# Patient Record
Sex: FEMALE | Race: WHITE | NOT HISPANIC OR LATINO | Employment: OTHER | URBAN - METROPOLITAN AREA
[De-identification: names, ages, dates, MRNs, and addresses within clinical notes are randomized per-mention and may not be internally consistent; named-entity substitution may affect disease eponyms.]

---

## 2017-01-19 ENCOUNTER — OFFICE VISIT (OUTPATIENT)
Dept: MEDICAL GROUP | Facility: MEDICAL CENTER | Age: 72
End: 2017-01-19
Payer: MEDICARE

## 2017-01-19 VITALS
WEIGHT: 118 LBS | TEMPERATURE: 98.2 F | BODY MASS INDEX: 23.16 KG/M2 | RESPIRATION RATE: 16 BRPM | SYSTOLIC BLOOD PRESSURE: 132 MMHG | HEART RATE: 64 BPM | DIASTOLIC BLOOD PRESSURE: 84 MMHG | OXYGEN SATURATION: 98 % | HEIGHT: 60 IN

## 2017-01-19 DIAGNOSIS — R42 VERTIGO: ICD-10-CM

## 2017-01-19 DIAGNOSIS — J30.9 ALLERGIC RHINITIS, UNSPECIFIED ALLERGIC RHINITIS TRIGGER, UNSPECIFIED RHINITIS SEASONALITY: ICD-10-CM

## 2017-01-19 DIAGNOSIS — R25.3 EYE MUSCLE TWITCHES: ICD-10-CM

## 2017-01-19 DIAGNOSIS — E89.0 HYPOTHYROIDISM, POSTSURGICAL: ICD-10-CM

## 2017-01-19 DIAGNOSIS — Z12.31 ENCOUNTER FOR SCREENING MAMMOGRAM FOR BREAST CANCER: ICD-10-CM

## 2017-01-19 PROCEDURE — 99214 OFFICE O/P EST MOD 30 MIN: CPT | Performed by: NURSE PRACTITIONER

## 2017-01-19 RX ORDER — FLUTICASONE PROPIONATE 50 MCG
1 SPRAY, SUSPENSION (ML) NASAL DAILY
Qty: 16 G | Refills: 1 | Status: SHIPPED | OUTPATIENT
Start: 2017-01-19 | End: 2017-07-20 | Stop reason: SDUPTHER

## 2017-01-19 NOTE — PROGRESS NOTES
Subjective:     Chief Complaint   Patient presents with   • Dizziness     right eye twitches,      Urszula Isabelle Witt is a 71 y.o. female here today to follow up on:    Allergic rhinitis  Increased symptoms recently, unclear if she's been taking her daily Claritin. She is having brief episodes of vertigo as well, usually triggered by head movement or change of position. Lasting about 30 seconds at a time and then resolving. She's not tried any treatment. No associated nausea, vomiting, headache, facial pain, rapid heartbeat, shortness of breath    Hypothyroidism, postsurgical  Last labs with TSH normal at 0.74, T4 elevated at 1.83   Dose was reduced at that time to 50 µg levothyroxine daily, she's noted more fatigue since dose change. No constipation, hair loss, weight change due to recheck labs    A few days ago her sister noticed twitching of the right eye muscles. This has since resolved    Current medicines (including changes today)  Current Outpatient Prescriptions   Medication Sig Dispense Refill   • fluticasone (FLONASE) 50 MCG/ACT nasal spray Spray 1 Spray in nose every day. 16 g 1   • vitamin D (CHOLECALCIFEROL) 1000 UNIT Tab Take 1 Tab by mouth every day. 30 Tab 11   • levothyroxine (SYNTHROID) 50 MCG Tab Take 1 Tab by mouth Every morning on an empty stomach. 30 Tab 3   • loratadine (CLARITIN) 10 MG Tab Take 1 Tab by mouth every day. 90 Tab 3   • Calcium Carb-Cholecalciferol (CALCIUM 600+D3) 600-200 MG-UNIT Tab 1 tab PO twice daily 60 Tab 6   • Memantine HCl ER (NAMENDA XR) 28 MG CAPSULE SR 24 HR Take 1 Cap by mouth every day. 30 Cap 11   • paroxetine (PAXIL) 20 MG Tab Take 2 Tabs by mouth every day. 60 Tab 6   • RAZADYNE ER 24 MG ER capsule TAKE 1 CAPSULE BY MOUTH EVERY MORNING WITH BREAKFAST. 90 Cap 3   • ibuprofen (MOTRIN) 400 MG Tab Take 1 Tab by mouth every 6 hours as needed. 90 Tab 0   • alendronate (FOSAMAX) 70 MG Tab Take 1 Tab by mouth every 7 days. 12 Tab 1   • BIOTIN MAXIMUM STRENGTH PO Take  10,000 mcg by mouth every day.     • acetaminophen (TYLENOL) 325 MG Tab Take 650 mg by mouth every four hours as needed.     • docusate sodium (COLACE) 100 MG Cap Take 100 mg by mouth 2 times a day.     • Hydrocortisone-Aloe Vera 1 % Cream by Apply externally route.     • cyanocobalamin (VITAMIN B-12) 500 MCG TABS Take 500 mcg by mouth every day.     • Calcium Carbonate-Vit D-Min (CALCIUM 1200 PO) Take 1,200 mg by mouth every day.       No current facility-administered medications for this visit.     She  has a past medical history of Vitamin D deficiency; Varicose veins; Allergic rhinitis; Hemorrhoids; Anxiety; Osteoporosis; Restless legs syndrome (RLS); Arthritis; Thyroid nodule (2012); CATARACT; Depression; S/P subtotal thyroidectomy (2012); Hypothyroidism, postsurgical (2012); and Dementia.    ROS included above     Objective:     Blood pressure 132/84, pulse 64, temperature 36.8 °C (98.2 °F), resp. rate 16, height 1.524 m (5'), weight 53.524 kg (118 lb), SpO2 98 %. Body mass index is 23.05 kg/(m^2).     Physical Exam:  General: Alert, oriented in no acute distress.  Eye contact is good, speech is normal, affect calm  HEENT: Oral mucosa pink moist, no lesions. Nares with erythema, edema, clear mucus. No maxillary or frontal sinus tenderness. TMs gray with good landmarks bilaterally. No cervical or supraclavicular lymphadenopathy.  Lungs: clear to auscultation bilaterally, normal effort, no wheeze/ rhonchi/ rales.  CV: regular rate and rhythm, S1, S2, no murmur  , No CVAT, no hepatosplenomegaly  Ext: no edema, color normal, vascularity normal, temperature normal    Assessment and Plan:   The following treatment plan was discussed   1. Vertigo   brief episodes with change of position or movement of the head possibly related to uncontrolled congestion. Sister will verify with the care facility that she is receiving her Claritin daily. We will also add Flonase, notify me if not improving. May consider referral for  vestibular therapy if needed  CBC WITH DIFFERENTIAL    fluticasone (FLONASE) 50 MCG/ACT nasal spray   2. Hypothyroidism, postsurgical   dose adjustment at the end of November, due to recheck labs  TSH+FREE T4   3. Allergic rhinitis, unspecified allergic rhinitis trigger, unspecified rhinitis seasonality   as discussed above    4. Eye muscle twitches   isolated incident now resolved. Follow-up for persistent problems    5. Encounter for screening mammogram for breast cancer  MA-SCREEN MAMMO W/CAD-BILAT       Followup: Pending labs         Please note that this dictation was created using voice recognition software. I have worked with consultants from the vendor as well as technical experts from [facility] [employer] to optimize the interface. I have made every reasonable attempt to correct obvious errors, but I expect that there are errors of grammar and possibly content that I did not discover before finalizing the note.

## 2017-01-19 NOTE — MR AVS SNAPSHOT
Urszula Witt   2017 10:00 AM   Office Visit   MRN: [id number]    Department:  [facility]   Dept Phone:  [phone]    Description:  Female : 1945   Provider:  BEA Sr           Reason for Visit     Dizziness right eye twitches,       Allergies as of 2017     Allergen Noted Reactions    Nkda [No Known Drug Allergy] 2016         You were diagnosed with     Vertigo   [771458]       Hypothyroidism, postsurgical   [200475]       Allergic rhinitis, unspecified allergic rhinitis trigger, unspecified rhinitis seasonality   [7726695]       Eye muscle twitches   [207631]       Encounter for screening mammogram for breast cancer   [2239905]         Vital Signs     Blood Pressure Pulse Temperature Respirations Height Weight    132/84 mmHg 64 36.8 °C (98.2 °F) 16 1.524 m (5') 53.524 kg (118 lb)    Body Mass Index Oxygen Saturation Smoking Status             23.05 kg/m2 98% Former Smoker         Basic Information     Date Of Birth Sex Race Ethnicity Preferred Language    1945 Female White Non- English      Your appointments     2017 11:30 AM   MA SCRN10 with BYRON ESQUEDA MG 1   [facility] MAMMOGRAPHY ([place])    [address] [facility] [address]  [facility] [address] [phone]           No deodorant, powder, perfume or lotion under the arm or breast area.            2017  1:20 PM   Follow Up Visit with Rory Maldonado M.D.   [facility] Neurology (--)    [address], [address]  [place] [address] [phone]           You will be receiving a confirmation call a few days before your appointment from our automated call confirmation system.            May 01, 2017  1:00 PM   Established Patient with BEA Sr   [facility]    [address]  [place] [address] [phone]           You will be receiving a confirmation call a few  days before your appointment from our automated call confirmation system.              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   8/15/2012 - Present    S/P subtotal thyroidectomy E89.0   Unknown - Present    Hypothyroidism, postsurgical E89.0   Unknown - Present    Alzheimer's disease G30.9   Unknown - Present    DNR (do not resuscitate) Z66   7/21/2016 - Present    Encounter for examination for admission to nursing home Z02.2   7/25/2016 - Present      Health Maintenance        Date Due Completion Dates    COLON CANCER SCREENING ANNUAL FIT 1945 ---    IMM DTaP/Tdap/Td Vaccine (1 - Tdap) 3/10/1962 ---    IMM ZOSTER VACCINE 3/10/2003 ---    IMM PNEUMOCOCCAL 65+ (ADULT) LOW/MEDIUM RISK SERIES (2 of 2 - PPSV23) 6/15/2012 6/15/2011    MAMMOGRAM 12/15/2016 12/15/2015, 2/18/2014, 9/27/2012    BONE DENSITY 4/29/2021 4/29/2016, 9/27/2012            Current Immunizations     13-VALENT PCV PREVNAR 6/15/2011    Influenza TIV (IM) 10/21/2013    Influenza Vaccine Quad Inj (Preserved) 10/24/2016    Tuberculin Skin Test 7/11/2012      Below and/or attached are the medications your provider expects you to take. Review all of your home medications and newly ordered medications with your provider and/or pharmacist. Follow medication instructions as directed by your provider and/or pharmacist. Please keep your medication list with you and share with your provider. Update the information when medications are discontinued, doses are changed, or new  medications (including over-the-counter products) are added; and carry medication information at all times in the event of emergency situations     Allergies:  NKDA - (reactions not documented)               Medications  Valid as of: January 19, 2017 - 11:10 AM    Generic Name Brand Name Tablet Size Instructions for use    Acetaminophen (Tab) TYLENOL 325 MG Take 650 mg by mouth every four hours as needed.        Alendronate Sodium (Tab) FOSAMAX 70 MG Take 1 Tab by mouth every 7 days.        Biotin   Take 10,000 mcg by mouth every day.        Calcium Carb-Cholecalciferol (Tab) Calcium Carb-Cholecalciferol 600-200 MG-UNIT 1 tab PO twice daily        Calcium Carbonate-Vit D-Min   Take 1,200 mg by mouth every day.        Cholecalciferol (Tab) cholecalciferol 1000 UNIT Take 1 Tab by mouth every day.        Cyanocobalamin (Tab) VITAMIN B-12 500 MCG Take 500 mcg by mouth every day.        Docusate Sodium (Cap) COLACE 100 MG Take 100 mg by mouth 2 times a day.        Fluticasone Propionate (Suspension) FLONASE 50 MCG/ACT Spray 1 Spray in nose every day.        Galantamine Hydrobromide (CAPSULE SR 24 HR) RAZADYNE ER 24 MG TAKE 1 CAPSULE BY MOUTH EVERY MORNING WITH BREAKFAST.        Hydrocortisone-Aloe Vera (Cream) Hydrocortisone-Aloe Vera 1 % by Apply externally route.        Ibuprofen (Tab) MOTRIN 400 MG Take 1 Tab by mouth every 6 hours as needed.        Levothyroxine Sodium (Tab) SYNTHROID 50 MCG Take 1 Tab by mouth Every morning on an empty stomach.        Loratadine (Tab) CLARITIN 10 MG Take 1 Tab by mouth every day.        Memantine HCl (CAPSULE SR 24 HR) NAMENDA 28 MG Take 1 Cap by mouth every day.        PARoxetine HCl (Tab) PAXIL 20 MG Take 2 Tabs by mouth every day.        .                 Medicines prescribed today were sent to:     [facility] [place] - [address]    [address] [place] [address]    Phone: [phone] Fax: [phone]    Open 24 Hours?: No    [facility] [place] - [address]    [address] [facility] [place] [address]    Phone: [phone] Fax: [phone]    Open 24 Hours?: No      Medication refill instructions:       If your prescription bottle indicates you have medication refills left, it is not necessary to call your provider’s office. Please contact your pharmacy and they will refill your medication.    If your prescription bottle indicates you do not have any refills left, you may request refills at any time through one of the following ways: The online [employer] system (except Urgent Care), by calling your provider’s office, or by asking your pharmacy to contact your provider’s office with a refill request. Medication refills are processed only during regular business hours and may not be available until the next business day. Your provider may request additional information or to have a follow-up visit with you prior to refilling your medication.   *Please Note: Medication refills are assigned a new Rx number when refilled electronically. Your pharmacy may indicate that no refills were authorized even though a new prescription for the same medication is available at the pharmacy. Please request the medicine by name with the pharmacy before contacting your provider for a refill.        Your To Do List     Future Labs/Procedures Complete By Expires    CBC WITH DIFFERENTIAL  As directed 1/20/2018    MA-SCREEN MAMMO W/CAD-BILAT  As directed 1/19/2018         [employer] Access Code: Activation code not generated  Current [employer] Status: Active

## 2017-01-27 ENCOUNTER — HOSPITAL ENCOUNTER (OUTPATIENT)
Dept: LAB | Facility: MEDICAL CENTER | Age: 72
End: 2017-01-27
Attending: NURSE PRACTITIONER
Payer: MEDICARE

## 2017-01-27 DIAGNOSIS — R42 VERTIGO: ICD-10-CM

## 2017-01-27 LAB
BASOPHILS # BLD AUTO: 0.07 K/UL (ref 0–0.12)
BASOPHILS NFR BLD AUTO: 1.4 % (ref 0–1.8)
EOSINOPHIL # BLD: 0.12 K/UL (ref 0–0.51)
EOSINOPHIL NFR BLD AUTO: 2.4 % (ref 0–6.9)
ERYTHROCYTE [DISTWIDTH] IN BLOOD BY AUTOMATED COUNT: 47.6 FL (ref 35.9–50)
HCT VFR BLD AUTO: 44.7 % (ref 37–47)
HGB BLD-MCNC: 14.5 G/DL (ref 12–16)
IMM GRANULOCYTES # BLD AUTO: 0.01 K/UL (ref 0–0.11)
IMM GRANULOCYTES NFR BLD AUTO: 0.2 % (ref 0–0.9)
LYMPHOCYTES # BLD: 1.05 K/UL (ref 1–4.8)
LYMPHOCYTES NFR BLD AUTO: 20.9 % (ref 22–41)
MCH RBC QN AUTO: 31.8 PG (ref 27–33)
MCHC RBC AUTO-ENTMCNC: 32.4 G/DL (ref 33.6–35)
MCV RBC AUTO: 98 FL (ref 81.4–97.8)
MONOCYTES # BLD: 0.38 K/UL (ref 0–0.85)
MONOCYTES NFR BLD AUTO: 7.6 % (ref 0–13.4)
NEUTROPHILS # BLD: 3.39 K/UL (ref 2–7.15)
NEUTROPHILS NFR BLD AUTO: 67.5 % (ref 44–72)
NRBC # BLD AUTO: 0 K/UL
NRBC BLD-RTO: 0 /100 WBC
PLATELET # BLD AUTO: 305 K/UL (ref 164–446)
PMV BLD AUTO: 9.6 FL (ref 9–12.9)
RBC # BLD AUTO: 4.56 M/UL (ref 4.2–5.4)
T4 FREE SERPL-MCNC: 2.71 NG/DL (ref 0.53–1.43)
TSH SERPL DL<=0.005 MIU/L-ACNC: 5.26 UIU/ML (ref 0.3–3.7)
WBC # BLD AUTO: 5 K/UL (ref 4.8–10.8)

## 2017-01-27 PROCEDURE — 84443 ASSAY THYROID STIM HORMONE: CPT

## 2017-01-27 PROCEDURE — 84439 ASSAY OF FREE THYROXINE: CPT

## 2017-01-27 PROCEDURE — 36415 COLL VENOUS BLD VENIPUNCTURE: CPT

## 2017-01-27 PROCEDURE — 85025 COMPLETE CBC W/AUTO DIFF WBC: CPT

## 2017-01-30 ENCOUNTER — TELEPHONE (OUTPATIENT)
Dept: MEDICAL GROUP | Facility: MEDICAL CENTER | Age: 72
End: 2017-01-30

## 2017-01-30 DIAGNOSIS — E89.0 HYPOTHYROIDISM, POSTSURGICAL: ICD-10-CM

## 2017-01-30 NOTE — TELEPHONE ENCOUNTER
She will just need to recheck labs in about 6 weeks, orders placed. If still abnormal and may need to do an ultrasound of her thyroid or further testing

## 2017-01-30 NOTE — TELEPHONE ENCOUNTER
Spoke with pt regardling labs do you want pt do anything else along with stopping thyroid medication?

## 2017-01-31 NOTE — TELEPHONE ENCOUNTER
Pt's sister called with concerns about discontinuing thyroid medicine. She thought that she had part of her thyroid removed and believes she needs the medication. The pt has been complaining of dizziness. Please advise.     Call back number: [phone]

## 2017-01-31 NOTE — TELEPHONE ENCOUNTER
Pt's daughter left message stating the pt's assisted living needs order to discontinue Levothyroxine.     Please fax to: [phone]

## 2017-02-01 NOTE — TELEPHONE ENCOUNTER
Prescription written.  Spoke with sister, Margarita, regarding recent labs, questions answered. Patient will stop medication, recheck labs in March

## 2017-02-16 ENCOUNTER — HOSPITAL ENCOUNTER (OUTPATIENT)
Dept: RADIOLOGY | Facility: MEDICAL CENTER | Age: 72
End: 2017-02-16
Attending: NURSE PRACTITIONER
Payer: MEDICARE

## 2017-02-16 DIAGNOSIS — Z12.31 ENCOUNTER FOR SCREENING MAMMOGRAM FOR BREAST CANCER: ICD-10-CM

## 2017-02-16 PROCEDURE — 77063 BREAST TOMOSYNTHESIS BI: CPT

## 2017-02-22 DIAGNOSIS — F02.80 LATE ONSET ALZHEIMER'S DISEASE WITHOUT BEHAVIORAL DISTURBANCE (HCC): ICD-10-CM

## 2017-02-22 DIAGNOSIS — G30.1 LATE ONSET ALZHEIMER'S DISEASE WITHOUT BEHAVIORAL DISTURBANCE (HCC): ICD-10-CM

## 2017-02-22 RX ORDER — PAROXETINE HYDROCHLORIDE 40 MG/1
TABLET, FILM COATED ORAL
Qty: 30 TAB | Refills: 6 | Status: SHIPPED | OUTPATIENT
Start: 2017-02-22 | End: 2017-02-24

## 2017-02-23 ENCOUNTER — OFFICE VISIT (OUTPATIENT)
Dept: MEDICAL GROUP | Facility: MEDICAL CENTER | Age: 72
End: 2017-02-23
Payer: MEDICARE

## 2017-02-23 VITALS
OXYGEN SATURATION: 96 % | TEMPERATURE: 98.7 F | BODY MASS INDEX: 24.15 KG/M2 | WEIGHT: 123 LBS | HEIGHT: 60 IN | DIASTOLIC BLOOD PRESSURE: 78 MMHG | HEART RATE: 66 BPM | SYSTOLIC BLOOD PRESSURE: 122 MMHG | RESPIRATION RATE: 16 BRPM

## 2017-02-23 DIAGNOSIS — Z12.11 ENCOUNTER FOR FIT (FECAL IMMUNOCHEMICAL TEST) SCREENING: ICD-10-CM

## 2017-02-23 DIAGNOSIS — E89.0 POSTSURGICAL HYPOTHYROIDISM: ICD-10-CM

## 2017-02-23 DIAGNOSIS — F02.80 LATE ONSET ALZHEIMER'S DISEASE WITHOUT BEHAVIORAL DISTURBANCE (HCC): ICD-10-CM

## 2017-02-23 DIAGNOSIS — Z23 NEED FOR PNEUMOCOCCAL VACCINATION: ICD-10-CM

## 2017-02-23 DIAGNOSIS — F32.A ANXIETY AND DEPRESSION: ICD-10-CM

## 2017-02-23 DIAGNOSIS — G30.1 LATE ONSET ALZHEIMER'S DISEASE WITHOUT BEHAVIORAL DISTURBANCE (HCC): ICD-10-CM

## 2017-02-23 DIAGNOSIS — F41.9 ANXIETY AND DEPRESSION: ICD-10-CM

## 2017-02-23 PROCEDURE — 4040F PNEUMOC VAC/ADMIN/RCVD: CPT | Performed by: NURSE PRACTITIONER

## 2017-02-23 PROCEDURE — 99214 OFFICE O/P EST MOD 30 MIN: CPT | Performed by: NURSE PRACTITIONER

## 2017-02-23 PROCEDURE — 3017F COLORECTAL CA SCREEN DOC REV: CPT | Mod: 1P | Performed by: NURSE PRACTITIONER

## 2017-02-23 PROCEDURE — G8432 DEP SCR NOT DOC, RNG: HCPCS | Performed by: NURSE PRACTITIONER

## 2017-02-23 PROCEDURE — 3288F FALL RISK ASSESSMENT DOCD: CPT | Performed by: NURSE PRACTITIONER

## 2017-02-23 PROCEDURE — G8420 CALC BMI NORM PARAMETERS: HCPCS | Performed by: NURSE PRACTITIONER

## 2017-02-23 PROCEDURE — 0518F FALL PLAN OF CARE DOCD: CPT | Mod: 8P | Performed by: NURSE PRACTITIONER

## 2017-02-23 PROCEDURE — 1100F PTFALLS ASSESS-DOCD GE2>/YR: CPT | Performed by: NURSE PRACTITIONER

## 2017-02-23 PROCEDURE — 3014F SCREEN MAMMO DOC REV: CPT | Performed by: NURSE PRACTITIONER

## 2017-02-23 PROCEDURE — 1036F TOBACCO NON-USER: CPT | Performed by: NURSE PRACTITIONER

## 2017-02-23 PROCEDURE — G8482 FLU IMMUNIZE ORDER/ADMIN: HCPCS | Performed by: NURSE PRACTITIONER

## 2017-02-23 NOTE — ASSESSMENT & PLAN NOTE
Currently managed with Namenda XR 20 mg daily, razadyne ER 24 mg daily. Followed by neurology. Increasing difficulty with mood recently, frustrated much of the time

## 2017-02-23 NOTE — PROGRESS NOTES
Subjective:     Chief Complaint   Patient presents with   • Follow-Up     Urszula Witt is a 71 y.o. female here today to follow up on:    Anxiety and depression  Worsening recently, frustrated with living situation and loss of independence, emotional at times  Continues with paxil 40 mg daily.  Interested in counseling  appt with Dr Maldonado tomorrow    Hypothyroidism, postsurgical  Partial thyroidectomy in 2012  Had been stable on replacement until recent labs which showed elevated T4 at 2.12, TSH also elevated at     Alzheimer's disease  Currently managed with Namenda XR 20 mg daily, razadyne ER 24 mg daily. Followed by neurology. Increasing difficulty with mood recently, frustrated much of the time     Due for pneumovax 23  Current medicines (including changes today)  Current Outpatient Prescriptions   Medication Sig Dispense Refill   • Calcium Carb-Cholecalciferol (CALCIUM + D3) 600-200 MG-UNIT Tab TAKE 1 TABLET BY MOUTH TWICE DAILY. -OSTEOPOROSIS (CYCLE MED) 60 Tab 11   • paroxetine (PAXIL) 40 MG tablet TAKE 1 TABLET BY MOUTH EVERY DAY. (CYCLE MED) 30 Tab 6   • fluticasone (FLONASE) 50 MCG/ACT nasal spray Spray 1 Spray in nose every day. 16 g 1   • vitamin D (CHOLECALCIFEROL) 1000 UNIT Tab Take 1 Tab by mouth every day. 30 Tab 11   • loratadine (CLARITIN) 10 MG Tab Take 1 Tab by mouth every day. 90 Tab 3   • Memantine HCl ER (NAMENDA XR) 28 MG CAPSULE SR 24 HR Take 1 Cap by mouth every day. 30 Cap 11   • paroxetine (PAXIL) 20 MG Tab Take 2 Tabs by mouth every day. 60 Tab 6   • RAZADYNE ER 24 MG ER capsule TAKE 1 CAPSULE BY MOUTH EVERY MORNING WITH BREAKFAST. 90 Cap 3   • ibuprofen (MOTRIN) 400 MG Tab Take 1 Tab by mouth every 6 hours as needed. 90 Tab 0   • alendronate (FOSAMAX) 70 MG Tab Take 1 Tab by mouth every 7 days. 12 Tab 1   • BIOTIN MAXIMUM STRENGTH PO Take 10,000 mcg by mouth every day.     • acetaminophen (TYLENOL) 325 MG Tab Take 650 mg by mouth every four hours as needed.     • docusate sodium  (COLACE) 100 MG Cap Take 100 mg by mouth 2 times a day.     • Hydrocortisone-Aloe Vera 1 % Cream by Apply externally route.     • cyanocobalamin (VITAMIN B-12) 500 MCG TABS Take 500 mcg by mouth every day.     • Calcium Carbonate-Vit D-Min (CALCIUM 1200 PO) Take 1,200 mg by mouth every day.       No current facility-administered medications for this visit.     She  has a past medical history of Vitamin D deficiency; Varicose veins; Allergic rhinitis; Hemorrhoids; Anxiety; Osteoporosis; Restless legs syndrome (RLS); Arthritis; Thyroid nodule (2012); CATARACT; Depression; S/P subtotal thyroidectomy (2012); Hypothyroidism, postsurgical (2012); and Dementia.    ROS included above     Objective:     Blood pressure 122/78, pulse 66, temperature 37.1 °C (98.7 °F), resp. rate 16, height 1.524 m (5'), weight 55.792 kg (123 lb), SpO2 96 %. Body mass index is 24.02 kg/(m^2).     Physical Exam:  General: Alert, oriented in no acute distress.  Eye contact is good, speech is normal, affect calm  HEENT: No lymphadenopathy. No thyromegaly, no obvious mass  Lungs: clear to auscultation bilaterally, normal effort, no wheeze/ rhonchi/ rales.  CV: regular rate and rhythm, S1, S2, no murmur  Abdomen: soft, nontender, no hepatosplenomegaly  Ext: no edema, color normal, vascularity normal, temperature normal    Assessment and Plan:   The following treatment plan was discussed   1. Anxiety and depression   currently managed with Paxil 40 mg daily, increasing depression recently. She is interested in counseling  REFERRAL TO BEHAVIORAL HEALTH   2. Postsurgical hypothyroidism   recent labs with TSH of 5.26, T4 2.12. Thyroid dose discontinued at that time. She complains of poor energy level today despite getting adequate rest. Due to recheck labs in 2 weeks. Ultrasound as listed below, follow-up pending results  US-SOFT TISSUES OF HEAD - NECK   3. Late onset Alzheimer's disease without behavioral disturbance   followed by neurology. Increasing  difficulty with   emotional lability. She has an appointment tomorrow with Dr. Steve    4. Encounter for FIT (fecal immunochemical test) screening  OCCULT BLOOD,FECAL,IMMUNOASSAY   5. Need for pneumococcal vaccination   we discussed administration of Pneumovax 20 today. Unfortunately, the patient left prior to receiving vaccine. Will follow up on this at next visit        Followup: pending labs        Please note that this dictation was created using voice recognition software. I have worked with consultants from the vendor as well as technical experts from [facility] [employer] to optimize the interface. I have made every reasonable attempt to correct obvious errors, but I expect that there are errors of grammar and possibly content that I did not discover before finalizing the note.

## 2017-02-23 NOTE — ASSESSMENT & PLAN NOTE
Partial thyroidectomy in 2012  Had been stable on replacement until recent labs which showed elevated T4 at 2.12, TSH also elevated at

## 2017-02-23 NOTE — MR AVS SNAPSHOT
Urszula Witt   2017 10:00 AM   Office Visit   MRN: [id number]    Department:  [facility]   Dept Phone:  [phone]    Description:  Female : 1945   Provider:  BEA Sr           Reason for Visit     Follow-Up           Allergies as of 2017     Allergen Noted Reactions    Nkda [No Known Drug Allergy] 2016         You were diagnosed with     Anxiety and depression   [240481]       Postsurgical hypothyroidism   [244.0.ICD-9-CM]       Late onset Alzheimer's disease without behavioral disturbance   [2503345]       Encounter for FIT (fecal immunochemical test) screening   [9209133]       Need for pneumococcal vaccination   [770845]       Hypothyroidism, postsurgical   [845518]         Vital Signs     Blood Pressure Pulse Temperature Respirations Height Weight    122/78 mmHg 66 37.1 °C (98.7 °F) 16 1.524 m (5') 55.792 kg (123 lb)    Body Mass Index Oxygen Saturation Smoking Status             24.02 kg/m2 96% Former Smoker         Basic Information     Date Of Birth Sex Race Ethnicity Preferred Language    1945 Female White Non- English      Your appointments     2017  1:20 PM   Follow Up Visit with Rory Maldonado M.D.   [facility] (--)    [address], [address]  [facility] [address] [phone]           You will be receiving a confirmation call a few days before your appointment from our automated call confirmation system.            Mar 07, 2017 10:30 AM   US [id number] with BYRON ESQUEDA US 2   [facility]    [facility]  [address]  [address]  [facility] [id number] [phone]            May 01, 2017  1:00 PM   Established Patient with BEA Sr   [facility] ([facility])    [address]  [facility] [address] [phone]           You will be receiving a confirmation call a few days before your appointment  from our automated call confirmation system.              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   8/15/2012 - Present    S/P subtotal thyroidectomy E89.0   Unknown - Present    Hypothyroidism, postsurgical E89.0   Unknown - Present    Alzheimer's disease G30.9   Unknown - Present    DNR (do not resuscitate) Z66   7/21/2016 - Present    Encounter for examination for admission to nursing home Z02.2   7/25/2016 - Present      Health Maintenance        Date Due Completion Dates    COLON CANCER SCREENING ANNUAL FIT 1945 ---    IMM DTaP/Tdap/Td Vaccine (1 - Tdap) 3/10/1962 ---    IMM ZOSTER VACCINE 3/10/2003 ---    IMM PNEUMOCOCCAL 65+ (ADULT) LOW/MEDIUM RISK SERIES (2 of 2 - PPSV23) 6/15/2012 6/15/2011    MAMMOGRAM 2/16/2018 2/16/2017, 12/15/2015, 2/18/2014, 9/27/2012    BONE DENSITY 4/29/2021 4/29/2016, 9/27/2012            Current Immunizations     13-VALENT PCV PREVNAR 6/15/2011    Influenza TIV (IM) 10/21/2013    Influenza Vaccine Quad Inj (Preserved) 10/24/2016    Pneumococcal polysaccharide vaccine (PPSV-23)  Incomplete    Tuberculin Skin Test 7/11/2012      Below and/or attached are the medications your provider expects you to take. Review all of your home medications and newly ordered medications with your provider and/or pharmacist. Follow medication instructions as directed by your provider and/or pharmacist. Please keep your medication list with you and share with your provider. Update the information when medications are  discontinued, doses are changed, or new medications (including over-the-counter products) are added; and carry medication information at all times in the event of emergency situations     Allergies:  NKDA - (reactions not documented)               Medications  Valid as of: February 23, 2017 - 11:53 AM    Generic Name Brand Name Tablet Size Instructions for use    Acetaminophen (Tab) TYLENOL 325 MG Take 650 mg by mouth every four hours as needed.        Alendronate Sodium (Tab) FOSAMAX 70 MG Take 1 Tab by mouth every 7 days.        Biotin   Take 10,000 mcg by mouth every day.        Calcium Carb-Cholecalciferol (Tab) Calcium + D3 600-200 MG-UNIT TAKE 1 TABLET BY MOUTH TWICE DAILY. -OSTEOPOROSIS (CYCLE MED)        Calcium Carbonate-Vit D-Min   Take 1,200 mg by mouth every day.        Cholecalciferol (Tab) cholecalciferol 1000 UNIT Take 1 Tab by mouth every day.        Cyanocobalamin (Tab) VITAMIN B-12 500 MCG Take 500 mcg by mouth every day.        Docusate Sodium (Cap) COLACE 100 MG Take 100 mg by mouth 2 times a day.        Fluticasone Propionate (Suspension) FLONASE 50 MCG/ACT Spray 1 Spray in nose every day.        Galantamine Hydrobromide (CAPSULE SR 24 HR) RAZADYNE ER 24 MG TAKE 1 CAPSULE BY MOUTH EVERY MORNING WITH BREAKFAST.        Hydrocortisone-Aloe Vera (Cream) Hydrocortisone-Aloe Vera 1 % by Apply externally route.        Ibuprofen (Tab) MOTRIN 400 MG Take 1 Tab by mouth every 6 hours as needed.        Loratadine (Tab) CLARITIN 10 MG Take 1 Tab by mouth every day.        Memantine HCl (CAPSULE SR 24 HR) NAMENDA 28 MG Take 1 Cap by mouth every day.        PARoxetine HCl (Tab) PAXIL 20 MG Take 2 Tabs by mouth every day.        PARoxetine HCl (Tab) PAXIL 40 MG TAKE 1 TABLET BY MOUTH EVERY DAY. (CYCLE MED)        .                 Medicines prescribed today were sent to:     [facility] [place] - [address]    [address] [place] [address]    Phone: [phone] Fax:  [phone]    Open 24 Hours?: No    [facility] [place] - [address]    [address] [place] [address]    Phone: [phone] Fax: [phone]    Open 24 Hours?: No      Medication refill instructions:       If your prescription bottle indicates you have medication refills left, it is not necessary to call your provider’s office. Please contact your pharmacy and they will refill your medication.    If your prescription bottle indicates you do not have any refills left, you may request refills at any time through one of the following ways: The online [employer] system (except Urgent Care), by calling your provider’s office, or by asking your pharmacy to contact your provider’s office with a refill request. Medication refills are processed only during regular business hours and may not be available until the next business day. Your provider may request additional information or to have a follow-up visit with you prior to refilling your medication.   *Please Note: Medication refills are assigned a new Rx number when refilled electronically. Your pharmacy may indicate that no refills were authorized even though a new prescription for the same medication is available at the pharmacy. Please request the medicine by name with the pharmacy before contacting your provider for a refill.        Your To Do List     Future Labs/Procedures Complete By Expires    US-SOFT TISSUES OF HEAD - NECK  As directed 8/26/2017      Referral     A referral request has been sent to our patient care coordination department. Please allow 3-5 business days for us to process this request and contact you either by phone or mail. If you do not hear from us by the 5th business day, please call us at [phone].           [employer] Access Code: Activation code not generated  Current [employer] Status: Active

## 2017-02-23 NOTE — ASSESSMENT & PLAN NOTE
Worsening recently, frustrated with living situation and loss of independence, emotional at times  Continues with paxil 40 mg daily.  Interested in counseling  appt with Dr Maldonado tomorrow

## 2017-02-24 ENCOUNTER — OFFICE VISIT (OUTPATIENT)
Dept: NEUROLOGY | Facility: MEDICAL CENTER | Age: 72
End: 2017-02-24
Payer: MEDICARE

## 2017-02-24 VITALS
OXYGEN SATURATION: 95 % | WEIGHT: 121.8 LBS | HEART RATE: 63 BPM | SYSTOLIC BLOOD PRESSURE: 138 MMHG | DIASTOLIC BLOOD PRESSURE: 74 MMHG | TEMPERATURE: 96.8 F | BODY MASS INDEX: 23.91 KG/M2 | HEIGHT: 60 IN

## 2017-02-24 DIAGNOSIS — G30.1 LATE ONSET ALZHEIMER'S DISEASE WITHOUT BEHAVIORAL DISTURBANCE (HCC): Primary | ICD-10-CM

## 2017-02-24 DIAGNOSIS — F02.80 LATE ONSET ALZHEIMER'S DISEASE WITHOUT BEHAVIORAL DISTURBANCE (HCC): Primary | ICD-10-CM

## 2017-02-24 PROCEDURE — 0518F FALL PLAN OF CARE DOCD: CPT | Mod: 8P | Performed by: PSYCHIATRY & NEUROLOGY

## 2017-02-24 PROCEDURE — G8482 FLU IMMUNIZE ORDER/ADMIN: HCPCS | Performed by: PSYCHIATRY & NEUROLOGY

## 2017-02-24 PROCEDURE — 4040F PNEUMOC VAC/ADMIN/RCVD: CPT | Performed by: PSYCHIATRY & NEUROLOGY

## 2017-02-24 PROCEDURE — G8432 DEP SCR NOT DOC, RNG: HCPCS | Performed by: PSYCHIATRY & NEUROLOGY

## 2017-02-24 PROCEDURE — 3014F SCREEN MAMMO DOC REV: CPT | Performed by: PSYCHIATRY & NEUROLOGY

## 2017-02-24 PROCEDURE — 1100F PTFALLS ASSESS-DOCD GE2>/YR: CPT | Performed by: PSYCHIATRY & NEUROLOGY

## 2017-02-24 PROCEDURE — 3288F FALL RISK ASSESSMENT DOCD: CPT | Performed by: PSYCHIATRY & NEUROLOGY

## 2017-02-24 PROCEDURE — 1036F TOBACCO NON-USER: CPT | Performed by: PSYCHIATRY & NEUROLOGY

## 2017-02-24 PROCEDURE — 99213 OFFICE O/P EST LOW 20 MIN: CPT | Performed by: PSYCHIATRY & NEUROLOGY

## 2017-02-24 PROCEDURE — 3017F COLORECTAL CA SCREEN DOC REV: CPT | Mod: 1P | Performed by: PSYCHIATRY & NEUROLOGY

## 2017-02-24 PROCEDURE — G8420 CALC BMI NORM PARAMETERS: HCPCS | Performed by: PSYCHIATRY & NEUROLOGY

## 2017-02-24 RX ORDER — PAROXETINE HYDROCHLORIDE 20 MG/1
20 TABLET, FILM COATED ORAL DAILY
Qty: 21 TAB | Refills: 0 | Status: SHIPPED | OUTPATIENT
Start: 2017-02-24 | End: 2017-04-13

## 2017-02-25 NOTE — PROGRESS NOTES
Subjective:      Urszula Witt is a 71 y.o. female who presents with her sister Margarita, for follow-up, with a history of Alzheimer's disease.    HPI    Unfortunately, still at the [facility] facility, she remains very unhappy. The people and the organization itself did not seem to engage her much, she still mourns the loss of her ability to drive, independence with living in her own home, etc. She is eating well. We placed her on Paxil 40 mg daily, this has not provided any additional benefit. Other than this, she seems to be doing well. She does take medications when given to her by hospital staff. She does enjoy herself when she is out with family. She is sleeping well, there are no reports by staff of her wandering, or engaging in otherwise dangerous activities. She has not been hallucinating. She remains independent with her ADLs. She has not been falling with regularity, there are no issues with incontinence. Margarita is asking about availability of social avenues and interactions through the Alzheimer's [employer] support groups. Her primary care physician also recommended seeing a therapist. She remains on Namenda XR and Razadyne ER with good tolerability.    Medical, surgical and family history is reviewed in electronic health record, there are no new drug allergies. She is on Namenda XR 28 mg daily, Razadyne ER 24 mg daily, Paxil 40 mg daily, the rest as per the electronic health record.    Review of Systems   All other systems reviewed and are negative.       Objective:     /74 mmHg  Pulse 63  Temp(Src) 36 °C (96.8 °F)  Ht 1.524 m (5')  Wt 55.248 kg (121 lb 12.8 oz)  BMI 23.79 kg/m2  SpO2 95%     Physical Exam    She appears in no acute distress. Clean and appropriately dressed, she is quite cooperative. Her vital signs are stable. There was no malar rash or temporal tenderness. Her neck is supple. She is near fully oriented, there is no aphasia, apraxia, or inattention. There  was some mild reduction in speech fluency, mental processing speed is slowed, she is perseverative at times, she will often repeat topics that had been discussed earlier in the evaluation. Otherwise, cranial nerve, motor, coordination and sensory assessments are for the most part intact.     Assessment/Plan:     1. Late onset Alzheimer's disease without behavioral disturbance  I concur that getting her in with the therapist is a good idea, it certainly offers her an excuse to get out and about. I also agree that finding some support groups through the Alzheimer's disease association would be helpful not only therapeutically but also she might actually engage some new friends. It does not appear that Paxil is working, Zoloft had not prior to this, so I will taper her off, 20 mg daily for 3 weeks and then stopping completely. At that point, Cymbalta or Wellbutrin might be legitimate options. She will continue the Razadyne and Namenda unchanged. I will follow-up with him in 6 months.    - paroxetine (PAXIL) 20 MG Tab; Take 1 Tab by mouth every day.  Dispense: 21 Tab; Refill: 0    Time: Evaluation of 20 minutes for exam, review, discussion, and education  Discussion: As mentioned in the assessment, over 50% of the time spent face-to-face counseling and coordinating care.

## 2017-02-28 ENCOUNTER — TELEPHONE (OUTPATIENT)
Dept: NEUROLOGY | Facility: MEDICAL CENTER | Age: 72
End: 2017-02-28

## 2017-02-28 NOTE — TELEPHONE ENCOUNTER
Pt's sister is calling and stating that her sister's PCP did not get the OV notes from Dr. Maldonado about her reduce in Paxil medication. OV re-faxed to PCP. Pt's notified. KA

## 2017-03-07 ENCOUNTER — HOSPITAL ENCOUNTER (OUTPATIENT)
Dept: RADIOLOGY | Facility: MEDICAL CENTER | Age: 72
End: 2017-03-07
Attending: NURSE PRACTITIONER
Payer: MEDICARE

## 2017-03-07 ENCOUNTER — TELEPHONE (OUTPATIENT)
Dept: NEUROLOGY | Facility: MEDICAL CENTER | Age: 72
End: 2017-03-07

## 2017-03-07 ENCOUNTER — HOSPITAL ENCOUNTER (OUTPATIENT)
Dept: LAB | Facility: MEDICAL CENTER | Age: 72
End: 2017-03-07
Attending: NURSE PRACTITIONER
Payer: MEDICARE

## 2017-03-07 DIAGNOSIS — E89.0 HYPOTHYROIDISM, POSTSURGICAL: ICD-10-CM

## 2017-03-07 DIAGNOSIS — E89.0 POSTSURGICAL HYPOTHYROIDISM: ICD-10-CM

## 2017-03-07 LAB
T3 SERPL-MCNC: 406.2 NG/DL (ref 60–181)
T4 FREE SERPL-MCNC: 1.93 NG/DL (ref 0.53–1.43)
TSH SERPL DL<=0.005 MIU/L-ACNC: 61.15 UIU/ML (ref 0.3–3.7)

## 2017-03-07 PROCEDURE — 76536 US EXAM OF HEAD AND NECK: CPT

## 2017-03-07 NOTE — TELEPHONE ENCOUNTER
Pt's sister is calling and asking if her sister's ID and Insurance card is still here. Called and LM for pt's sister with her  that they are here and PER CHAYO they are in the safe. They will be by the office on 3/8/17 to pick them up. REILLY

## 2017-03-08 ENCOUNTER — TELEPHONE (OUTPATIENT)
Dept: MEDICAL GROUP | Facility: MEDICAL CENTER | Age: 72
End: 2017-03-08

## 2017-03-08 DIAGNOSIS — R79.89 ELEVATED TSH: ICD-10-CM

## 2017-03-08 DIAGNOSIS — E05.90 HYPERTHYROIDISM WITHOUT CRISIS: ICD-10-CM

## 2017-03-21 ENCOUNTER — TELEPHONE (OUTPATIENT)
Dept: MEDICAL GROUP | Facility: MEDICAL CENTER | Age: 72
End: 2017-03-21

## 2017-03-21 DIAGNOSIS — Z01.812 PRE-PROCEDURE LAB EXAM: ICD-10-CM

## 2017-03-21 NOTE — TELEPHONE ENCOUNTER
1. Caller Name: Pt's sister                      Call Back Number: [phone] (M)    2. Message: Pt's sister left message stating they need an order for blood work for MRI. Imaging stated they need a Creatinine ordered.     3. Patient approves office to leave a detailed voicemail/MyChart message: N\A

## 2017-03-23 ENCOUNTER — HOSPITAL ENCOUNTER (OUTPATIENT)
Dept: LAB | Facility: MEDICAL CENTER | Age: 72
End: 2017-03-23
Attending: NURSE PRACTITIONER
Payer: MEDICARE

## 2017-03-23 DIAGNOSIS — Z01.812 PRE-PROCEDURE LAB EXAM: ICD-10-CM

## 2017-03-23 LAB — CREAT SERPL-MCNC: 0.95 MG/DL (ref 0.5–1.4)

## 2017-03-23 PROCEDURE — 36415 COLL VENOUS BLD VENIPUNCTURE: CPT

## 2017-03-23 PROCEDURE — 82565 ASSAY OF CREATININE: CPT

## 2017-03-28 ENCOUNTER — HOSPITAL ENCOUNTER (OUTPATIENT)
Dept: RADIOLOGY | Facility: MEDICAL CENTER | Age: 72
End: 2017-03-28
Attending: NURSE PRACTITIONER
Payer: MEDICARE

## 2017-03-28 DIAGNOSIS — E05.90 HYPERTHYROIDISM WITHOUT CRISIS: ICD-10-CM

## 2017-03-28 DIAGNOSIS — R79.89 ELEVATED TSH: ICD-10-CM

## 2017-03-28 PROCEDURE — 700117 HCHG RX CONTRAST REV CODE 255: Performed by: NURSE PRACTITIONER

## 2017-03-28 PROCEDURE — 70553 MRI BRAIN STEM W/O & W/DYE: CPT

## 2017-03-28 PROCEDURE — A9577 INJ MULTIHANCE: HCPCS | Performed by: NURSE PRACTITIONER

## 2017-03-28 RX ADMIN — GADOBENATE DIMEGLUMINE 10 ML: 529 INJECTION, SOLUTION INTRAVENOUS at 11:23

## 2017-03-30 ENCOUNTER — TELEPHONE (OUTPATIENT)
Dept: MEDICAL GROUP | Facility: MEDICAL CENTER | Age: 72
End: 2017-03-30

## 2017-03-30 DIAGNOSIS — E05.90 HYPERTHYROIDISM: ICD-10-CM

## 2017-03-30 DIAGNOSIS — D35.2 PITUITARY MICROADENOMA (HCC): ICD-10-CM

## 2017-03-30 NOTE — TELEPHONE ENCOUNTER
Spoke with patient's sister to explain MRI result. She will take sister to lab for additional testing. Referral to Dr Skinner placed

## 2017-04-05 ENCOUNTER — HOSPITAL ENCOUNTER (OUTPATIENT)
Dept: LAB | Facility: MEDICAL CENTER | Age: 72
End: 2017-04-05
Attending: NURSE PRACTITIONER
Payer: MEDICARE

## 2017-04-05 LAB
T4 FREE SERPL-MCNC: 0.41 NG/DL (ref 0.53–1.43)
TSH SERPL DL<=0.005 MIU/L-ACNC: 77.2 UIU/ML (ref 0.3–3.7)

## 2017-04-05 PROCEDURE — 83520 IMMUNOASSAY QUANT NOS NONAB: CPT

## 2017-04-05 PROCEDURE — 84443 ASSAY THYROID STIM HORMONE: CPT

## 2017-04-05 PROCEDURE — 36415 COLL VENOUS BLD VENIPUNCTURE: CPT

## 2017-04-05 PROCEDURE — 84439 ASSAY OF FREE THYROXINE: CPT

## 2017-04-06 ENCOUNTER — TELEPHONE (OUTPATIENT)
Dept: MEDICAL GROUP | Facility: MEDICAL CENTER | Age: 72
End: 2017-04-06

## 2017-04-06 NOTE — TELEPHONE ENCOUNTER
----- Message from BEA Sr sent at 4/6/2017  7:40 AM PDT -----  Please check with endo to get patient scheduled- referral was processed 3/30/17

## 2017-04-07 NOTE — TELEPHONE ENCOUNTER
Called ENDO, they state they are trying to get Pt in with Dr. Skinner as he has the soonest available openings starting on the 17th. They are booked solid and this is their only option per Tammy.

## 2017-04-10 ENCOUNTER — OFFICE VISIT (OUTPATIENT)
Dept: MEDICAL GROUP | Facility: CLINIC | Age: 72
End: 2017-04-10
Payer: MEDICARE

## 2017-04-10 VITALS
HEIGHT: 60 IN | RESPIRATION RATE: 16 BRPM | WEIGHT: 118 LBS | OXYGEN SATURATION: 97 % | HEART RATE: 70 BPM | DIASTOLIC BLOOD PRESSURE: 90 MMHG | BODY MASS INDEX: 23.16 KG/M2 | TEMPERATURE: 97 F | SYSTOLIC BLOOD PRESSURE: 130 MMHG

## 2017-04-10 DIAGNOSIS — E89.0 HYPOTHYROIDISM, POSTSURGICAL: ICD-10-CM

## 2017-04-10 DIAGNOSIS — E89.0 S/P SUBTOTAL THYROIDECTOMY: ICD-10-CM

## 2017-04-10 PROCEDURE — 1100F PTFALLS ASSESS-DOCD GE2>/YR: CPT | Performed by: NURSE PRACTITIONER

## 2017-04-10 PROCEDURE — 1036F TOBACCO NON-USER: CPT | Performed by: NURSE PRACTITIONER

## 2017-04-10 PROCEDURE — G8420 CALC BMI NORM PARAMETERS: HCPCS | Performed by: NURSE PRACTITIONER

## 2017-04-10 PROCEDURE — 3014F SCREEN MAMMO DOC REV: CPT | Performed by: NURSE PRACTITIONER

## 2017-04-10 PROCEDURE — 4040F PNEUMOC VAC/ADMIN/RCVD: CPT | Performed by: NURSE PRACTITIONER

## 2017-04-10 PROCEDURE — 0518F FALL PLAN OF CARE DOCD: CPT | Mod: 8P | Performed by: NURSE PRACTITIONER

## 2017-04-10 PROCEDURE — 99214 OFFICE O/P EST MOD 30 MIN: CPT | Performed by: NURSE PRACTITIONER

## 2017-04-10 PROCEDURE — 3288F FALL RISK ASSESSMENT DOCD: CPT | Performed by: NURSE PRACTITIONER

## 2017-04-10 PROCEDURE — G8432 DEP SCR NOT DOC, RNG: HCPCS | Performed by: NURSE PRACTITIONER

## 2017-04-10 RX ORDER — LEVOTHYROXINE SODIUM 0.03 MG/1
25 TABLET ORAL
Qty: 30 TAB | Refills: 1 | Status: SHIPPED | OUTPATIENT
Start: 2017-04-10 | End: 2017-04-10 | Stop reason: SDUPTHER

## 2017-04-10 RX ORDER — LEVOTHYROXINE SODIUM 0.05 MG/1
50 TABLET ORAL
Qty: 30 TAB | Refills: 2 | Status: SHIPPED | OUTPATIENT
Start: 2017-04-10 | End: 2017-05-05

## 2017-04-10 RX ORDER — LEVOTHYROXINE SODIUM 0.05 MG/1
50 TABLET ORAL
Qty: 30 TAB | Refills: 2 | Status: SHIPPED | OUTPATIENT
Start: 2017-04-10 | End: 2017-04-10 | Stop reason: SDUPTHER

## 2017-04-10 ASSESSMENT — ENCOUNTER SYMPTOMS
CHILLS: 0
ABDOMINAL PAIN: 0
SHORTNESS OF BREATH: 0
WEAKNESS: 1
WHEEZING: 0
FEVER: 0
SPUTUM PRODUCTION: 0
DIARRHEA: 0
COUGH: 0
FALLS: 0
DEPRESSION: 1
DIZZINESS: 1
VOMITING: 0
NAUSEA: 1

## 2017-04-10 NOTE — MR AVS SNAPSHOT
Urszula Walton Eduar   4/10/2017 3:40 PM   Office Visit   MRN: [id number]    Department:  [facility]   Dept Phone:  [phone]    Description:  Female : 1945   Provider:  BEA Lugo           Reason for Visit     Dizziness nausea/ dizzy/ sweating/       Allergies as of 4/10/2017     Allergen Noted Reactions    Nkda [No Known Drug Allergy] 2016         You were diagnosed with     S/P subtotal thyroidectomy   [665111]       Hypothyroidism, postsurgical   [616297]         Vital Signs     Blood Pressure Pulse Temperature Respirations Height Weight    130/90 mmHg 70 36.1 °C (97 °F) 16 1.524 m (5') 53.524 kg (118 lb)    Body Mass Index Oxygen Saturation Smoking Status             23.05 kg/m2 97% Former Smoker         Basic Information     Date Of Birth Sex Race Ethnicity Preferred Language    1945 Female White Non- English      Your appointments     May 01, 2017  1:00 PM   Established Patient with BEA Sr   [facility])    [address]  [place] [address] [phone]           You will be receiving a confirmation call a few days before your appointment from our automated call confirmation system.            Aug 25, 2017  1:20 PM   Follow Up Visit with Rory Maldonado M.D.   [facility] (--)    [address], [address]  [place] [address] [phone]           You will be receiving a confirmation call a few days before your appointment from our automated call confirmation system.              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   8/15/2012 - Present    S/P subtotal thyroidectomy E89.0   Unknown - Present    Hypothyroidism, postsurgical E89.0   Unknown - Present    DNR (do not resuscitate) Z66   7/21/2016 - Present    Encounter for examination for admission to nursing home Z02.2   7/25/2016 - Present    Late onset Alzheimer's disease without behavioral disturbance G30.1, F02.80   2/24/2017 - Present      Health Maintenance        Date Due Completion Dates    COLON CANCER SCREENING ANNUAL FIT 1945 ---    IMM DTaP/Tdap/Td Vaccine (1 - Tdap) 3/10/1962 ---    IMM ZOSTER VACCINE 3/10/2003 ---    IMM PNEUMOCOCCAL 65+ (ADULT) LOW/MEDIUM RISK SERIES (2 of 2 - PPSV23) 6/15/2012 6/15/2011    MAMMOGRAM 2/16/2018 2/16/2017, 12/15/2015, 2/18/2014, 9/27/2012    BONE DENSITY 4/29/2021 4/29/2016, 9/27/2012            Current Immunizations     13-VALENT PCV PREVNAR 6/15/2011    Influenza TIV (IM) 10/21/2013    Influenza Vaccine Quad Inj (Preserved) 10/24/2016    Tuberculin Skin Test 7/11/2012      Below and/or attached are the medications your provider expects you to take. Review all of your home medications and newly ordered medications with your provider and/or pharmacist. Follow medication instructions as directed by your provider and/or pharmacist. Please keep your medication list with you and share with your provider. Update the information when medications are discontinued, doses are changed, or new medications (including over-the-counter products) are added; and carry medication information at all times in the event of emergency situations     Allergies:  NKDA - (reactions not documented)               Medications  Valid as of: April 10, 2017 -  5:09 PM    Generic Name Brand Name Tablet Size Instructions for use    Acetaminophen (Tab) TYLENOL 325 MG Take 650 mg by mouth every four hours as needed.         Alendronate Sodium (Tab) FOSAMAX 70 MG Take 1 Tab by mouth every 7 days.        Biotin   Take 10,000 mcg by mouth every day.        Calcium Carb-Cholecalciferol (Tab) Calcium + D3 600-200 MG-UNIT TAKE 1 TABLET BY MOUTH TWICE DAILY. -OSTEOPOROSIS (CYCLE MED)        Calcium Carbonate-Vit D-Min   Take 1,200 mg by mouth every day.        Cholecalciferol (Tab) cholecalciferol 1000 UNIT Take 1 Tab by mouth every day.        Cyanocobalamin (Tab) VITAMIN B-12 500 MCG Take 500 mcg by mouth every day.        Docusate Sodium (Cap) COLACE 100 MG Take 100 mg by mouth 2 times a day.        Fluticasone Propionate (Suspension) FLONASE 50 MCG/ACT Spray 1 Spray in nose every day.        Galantamine Hydrobromide (CAPSULE SR 24 HR) RAZADYNE ER 24 MG TAKE 1 CAPSULE BY MOUTH EVERY MORNING WITH BREAKFAST.        Hydrocortisone-Aloe Vera (Cream) Hydrocortisone-Aloe Vera 1 % by Apply externally route.        Ibuprofen (Tab) MOTRIN 400 MG Take 1 Tab by mouth every 6 hours as needed.        Levothyroxine Sodium (Tab) SYNTHROID 25 MCG Take 1 Tab by mouth Every morning on an empty stomach.        Loratadine (Tab) CLARITIN 10 MG Take 1 Tab by mouth every day.        Memantine HCl (CAPSULE SR 24 HR) NAMENDA 28 MG Take 1 Cap by mouth every day.        PARoxetine HCl (Tab) PAXIL 20 MG Take 1 Tab by mouth every day.        .                 Medicines prescribed today were sent to:     [facility] [place] [address] [place] [address] [address]    Phone: [phone] Fax: [phone]    Open 24 Hours?: No      Medication refill instructions:       If your prescription bottle indicates you have medication refills left, it is not necessary to call your provider’s office. Please contact your pharmacy and they will refill your medication.    If your prescription bottle indicates you do not have any refills left, you may request refills at any time through one of the following ways: The online [employer] system  (except Urgent Care), by calling your provider’s office, or by asking your pharmacy to contact your provider’s office with a refill request. Medication refills are processed only during regular business hours and may not be available until the next business day. Your provider may request additional information or to have a follow-up visit with you prior to refilling your medication.   *Please Note: Medication refills are assigned a new Rx number when refilled electronically. Your pharmacy may indicate that no refills were authorized even though a new prescription for the same medication is available at the pharmacy. Please request the medicine by name with the pharmacy before contacting your provider for a refill.           [employer] Access Code: Activation code not generated  Current [employer] Status: Active

## 2017-04-10 NOTE — PROGRESS NOTES
Chief Complaint   Patient presents with   • Dizziness     nausea/ dizzy/ sweating/        HISTORY OF PRESENT ILLNESS: Patient is a 72 y.o. female established patient who presents today due to months hx of dizziness, weakness, low energy level. Pt's sister reports that pt has thyroid problem and her number was noted to be high so her thyroid medication was discontinued and then she also underwent brain MRI and she was then referred to endocrinologist.   Chart reviewed noticed that pt has underlying disease of postsurgical hypothyroidism (subtotal thyroidism in 2012). She was on levothyroxine 50-75 mcg. She is noted to have TSH/FT4 5.26/2.73 in Feb 2017 and levothyroxine was discontinued. Pt is noted to have elevate TSH to 61 in March and 77 in April. No levothyroxine at this time. 3/28 Brain MRI showing 4.8 mm hypoenhancing focus in the pituitary at the midline suggesting a pituitary microadenoma. Mild to moderate cerebral atrophy. According to correspondence, pt is going to see endocrinologist, possible 4/17?, appointment is not scheduled.       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 21 Tab 0   • Calcium Carb-Cholecalciferol (CALCIUM + D3) 600-200 MG-UNIT Tab TAKE 1 TABLET BY MOUTH TWICE DAILY.  -OSTEOPOROSIS (CYCLE MED) 60 Tab 11   • fluticasone (FLONASE) 50 MCG/ACT nasal spray Spray 1 Spray in nose every day. 16 g 1   • vitamin D (CHOLECALCIFEROL) 1000 UNIT Tab Take 1 Tab by mouth every day. 30 Tab 11   • loratadine (CLARITIN) 10 MG Tab Take 1 Tab by mouth every day. 90 Tab 3   • Memantine HCl ER (NAMENDA XR) 28 MG CAPSULE SR 24 HR Take 1 Cap by mouth every day. 30 Cap 11   • RAZADYNE ER 24 MG ER capsule TAKE 1 CAPSULE BY MOUTH EVERY MORNING WITH BREAKFAST. 90 Cap 3   • ibuprofen (MOTRIN) 400 MG Tab Take 1 Tab by mouth every 6 hours as needed. 90 Tab 0   • alendronate (FOSAMAX) 70 MG Tab Take 1 Tab by mouth every 7 days. 12 Tab 1   • BIOTIN MAXIMUM STRENGTH PO Take 10,000 mcg by mouth every day.     • acetaminophen (TYLENOL) 325 MG Tab Take 650 mg by mouth every four hours as needed.     • docusate sodium (COLACE) 100 MG Cap Take 100 mg by mouth 2 times a day.     • Hydrocortisone-Aloe Vera 1 % Cream by Apply externally route.     • cyanocobalamin (VITAMIN B-12) 500 MCG TABS Take 500 mcg by mouth every day.     • Calcium Carbonate-Vit D-Min (CALCIUM 1200 PO) Take 1,200 mg by mouth every day.       No current facility-administered medications for this visit.       Social History   Substance Use Topics   • Smoking status: Former Smoker -- 2.00 packs/day for 25 years     Types: Cigarettes     Quit date: 1987   • Smokeless tobacco: Never Used   • Alcohol Use: 1.5 oz/week     3 Glasses of wine per week      Comment: 1 glass of wine a week       Family Status   Relation Status Death Age   • Mother     • Father     • Sister Alive    • Brother Alive    • Maternal Grandmother     • Maternal Grandfather     • Paternal Grandmother     • Paternal Grandfather       Family History   Problem Relation Age of Onset   • Cancer Mother    • Cancer Father    • Cancer Maternal Grandmother    • Heart Disease Maternal Grandfather    • Heart Disease Paternal Grandmother    •  Lung Disease Paternal Grandfather          ROS:  Review of Systems   Constitutional: Positive for malaise/fatigue. Negative for fever and chills.   Respiratory: Negative for cough, sputum production, shortness of breath and wheezing.    Gastrointestinal: Positive for nausea. Negative for vomiting, abdominal pain and diarrhea.   Musculoskeletal: Negative for falls.   Neurological: Positive for dizziness and weakness.   Psychiatric/Behavioral: Positive for depression.        Objective:     Blood pressure 130/90, pulse 70, temperature 36.1 °C (97 °F), resp. rate 16, height 1.524 m (5'), weight 53.524 kg (118 lb), SpO2 97 %.     Physical Exam:  Physical Exam   Constitutional: She is oriented to person, place, and time and well-developed, well-nourished, and in no distress.   HENT:   Head: Normocephalic and atraumatic.   Eyes: Conjunctivae are normal.   Neck: Neck supple. No JVD present.   Cardiovascular: Normal rate.    Pulmonary/Chest: Effort normal. No respiratory distress. She has no wheezes. She has no rales.   Neurological: She is alert and oriented to person, place, and time.   Gait slow but steady   Skin: Skin is dry. No erythema.   Vitals reviewed.        Assessment/Plan:  1. S/P subtotal thyroidectomy  - levothyroxine (SYNTHROID) 50 MCG Tab; Take 1 Tab by mouth Every morning on an empty stomach.  Dispense: 30 Tab; Refill: 2    2. Hypothyroidism, postsurgical  - 4/5/2017 TSH 77  - levothyroxine (SYNTHROID) 50 MCG Tab; Take 1 Tab by mouth Every morning on an empty stomach.  Dispense: 30 Tab; Refill: 2 --> Pt was taking 50-75 mcg last year, 11/2016 on 50 mcg which is discontinued on 2/1/2017. 1/27/2017 TSH 5.26. Levothyroxine Might need to increase to 75 mcg. Instruct pt to follow up TSH in 6-8 weeks with PCP or endocrinologist to adjust. Pt's sister verbalized understanding.   - Endocrinologist referral made by PCP already for pituitary microadenoma, only 4.8 mm. Was thinking order prolactin but since pt is going  to see endocrinologist so that will have endocrinologist to decide lab order. Possible 4/17? No appointment scheduled yet. Instructed pt to call PCP to follow up if not getting call in next 2-3 days. Pt and sister verbalized understanding.     Total time spent was 40 minutes with >50% of time spent on counseling and coordination of care.

## 2017-04-12 ENCOUNTER — TELEPHONE (OUTPATIENT)
Dept: NEUROLOGY | Facility: MEDICAL CENTER | Age: 72
End: 2017-04-12

## 2017-04-12 DIAGNOSIS — F02.80 LATE ONSET ALZHEIMER'S DISEASE WITHOUT BEHAVIORAL DISTURBANCE (HCC): ICD-10-CM

## 2017-04-12 DIAGNOSIS — G30.1 LATE ONSET ALZHEIMER'S DISEASE WITHOUT BEHAVIORAL DISTURBANCE (HCC): ICD-10-CM

## 2017-04-12 LAB — MISCELLANEOUS LAB RESULT MISCLAB: NORMAL

## 2017-04-12 NOTE — TELEPHONE ENCOUNTER
Pt's sister is calling and stating that her sister has been on the lower dose of the Paxil. She has been taking the 1/2 tablet for three weeks now and she is ready to get off this medication and start a new Anti-depressant medication. Per your note you were going to try either Cymbalta or Wellbutrin. She needs a new Rx sent to her pharmacy. Please advise. Thank you. REILLY

## 2017-04-13 RX ORDER — DULOXETIN HYDROCHLORIDE 30 MG/1
30 CAPSULE, DELAYED RELEASE ORAL DAILY
Qty: 30 CAP | Refills: 2 | Status: SHIPPED | OUTPATIENT
Start: 2017-04-13 | End: 2017-06-23 | Stop reason: SDUPTHER

## 2017-04-28 ENCOUNTER — PATIENT OUTREACH (OUTPATIENT)
Dept: HEALTH INFORMATION MANAGEMENT | Facility: OTHER | Age: 72
End: 2017-04-28

## 2017-04-28 NOTE — PROGRESS NOTES
Outbound call to Urszula for medication review. Left a voice message requesting patient to call back.     Michelle Zazueta, MIGUEL ÁNGELD

## 2017-05-02 ENCOUNTER — PATIENT OUTREACH (OUTPATIENT)
Dept: HEALTH INFORMATION MANAGEMENT | Facility: OTHER | Age: 72
End: 2017-05-02

## 2017-05-05 ENCOUNTER — OFFICE VISIT (OUTPATIENT)
Dept: ENDOCRINOLOGY | Facility: MEDICAL CENTER | Age: 72
End: 2017-05-05
Payer: MEDICARE

## 2017-05-05 VITALS
SYSTOLIC BLOOD PRESSURE: 132 MMHG | DIASTOLIC BLOOD PRESSURE: 86 MMHG | BODY MASS INDEX: 23.56 KG/M2 | HEIGHT: 60 IN | WEIGHT: 120 LBS | OXYGEN SATURATION: 92 % | HEART RATE: 74 BPM

## 2017-05-05 DIAGNOSIS — E55.9 VITAMIN D DEFICIENCY: ICD-10-CM

## 2017-05-05 DIAGNOSIS — E53.8 VITAMIN B12 DEFICIENCY: ICD-10-CM

## 2017-05-05 DIAGNOSIS — D35.2 PITUITARY ADENOMA (HCC): ICD-10-CM

## 2017-05-05 DIAGNOSIS — E03.9 HYPOTHYROIDISM (ACQUIRED): ICD-10-CM

## 2017-05-05 PROCEDURE — G8420 CALC BMI NORM PARAMETERS: HCPCS | Performed by: INTERNAL MEDICINE

## 2017-05-05 PROCEDURE — 4040F PNEUMOC VAC/ADMIN/RCVD: CPT | Performed by: INTERNAL MEDICINE

## 2017-05-05 PROCEDURE — 1036F TOBACCO NON-USER: CPT | Performed by: INTERNAL MEDICINE

## 2017-05-05 PROCEDURE — G8432 DEP SCR NOT DOC, RNG: HCPCS | Performed by: INTERNAL MEDICINE

## 2017-05-05 PROCEDURE — 99204 OFFICE O/P NEW MOD 45 MIN: CPT | Performed by: INTERNAL MEDICINE

## 2017-05-05 PROCEDURE — 3014F SCREEN MAMMO DOC REV: CPT | Performed by: INTERNAL MEDICINE

## 2017-05-05 PROCEDURE — 0518F FALL PLAN OF CARE DOCD: CPT | Mod: 8P | Performed by: INTERNAL MEDICINE

## 2017-05-05 PROCEDURE — 1100F PTFALLS ASSESS-DOCD GE2>/YR: CPT | Performed by: INTERNAL MEDICINE

## 2017-05-05 PROCEDURE — 3288F FALL RISK ASSESSMENT DOCD: CPT | Performed by: INTERNAL MEDICINE

## 2017-05-05 RX ORDER — LEVOTHYROXINE SODIUM 0.1 MG/1
100 TABLET ORAL
Qty: 30 TAB | Refills: 3 | Status: SHIPPED | OUTPATIENT
Start: 2017-05-05 | End: 2017-07-24

## 2017-05-05 NOTE — PROGRESS NOTES
New Patient Consult Note  Primary care physician: BEA Sr    Reason for consult:  Hypothyroidism and fatigue    HPI:  Urszula Witt is a 72 y.o. old patient who comes in today for evaluation of hypothyroidism (status post surgical removal of the right thyroid lobe due to nodule and the pathology was benign). She is currently on 50 µg of levothyroxine. She feels extremely tired and fatigued throughout the day. She is also feeling a lot depressed. Looking at her trend of her thyroid labs it seems that there may have been some confusion regarding its interpretation. On one instance the TSH was very high and at the same time the free T4 was also in the high normal range. It appears that the free T4 measurement here was most likely faulty or that the labs were drawn within 2-4 hours of her taking the thyroid medication.  Also the high doses of Biotene can interfere with the measurement of the thyroid function.   In any case looking at the trend of her thyroid labs it is consistent with hypothyroidism.     ROS:  Constitutional: Fatigue, No weight loss  Cardiac: No palpitations or racing heart  Resp: No shortness of breath  Neuro: No numbness or tinging in feet  Endo: No heat or cold intolerance, no polyuria or polydipsia  Psych : Depression  All other systems were reviewed and were negative.    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   • Anxiety and depression    • Vitamin d deficiency 03/29/2012   • Allergic rhinitis 03/29/2012   • HEMORRHOIDS 03/29/2012   • Varicose veins 03/29/2012   • Preventative health care 03/29/2012   • Osteoporosis    • Restless  legs syndrome (RLS)        Past Surgical History:  Past Surgical History   Procedure Laterality Date   • Tonsillectomy and adenoidectomy     • Bunionectomy       sam twice   • Appendectomy     • Thyroid lobectomy  8/15/2012     Performed by VIDA AMARAL at SURGERY SAME DAY [facility] ORS       Allergies:  Nkda    Social History:  Social History     Social History   • Marital Status:      Spouse Name: N/A   • Number of Children: N/A   • Years of Education: N/A     Occupational History   • Not on file.     Social History Main Topics   • Smoking status: Former Smoker -- 2.00 packs/day for 25 years     Types: Cigarettes     Quit date: 08/09/1987   • Smokeless tobacco: Never Used   • Alcohol Use: 1.5 oz/week     3 Glasses of wine per week      Comment: 1 glass of wine a week   • Drug Use: No   • Sexual Activity: Not on file     Other Topics Concern   • Not on file     Social History Narrative       Family History:  Family History   Problem Relation Age of Onset   • Cancer Mother    • Cancer Father    • Cancer Maternal Grandmother    • Heart Disease Maternal Grandfather    • Heart Disease Paternal Grandmother    • Lung Disease Paternal Grandfather        Medications:    Current outpatient prescriptions:   •  levothyroxine (SYNTHROID) 100 MCG Tab, Take 1 Tab by mouth Every morning on an empty stomach., Disp: 30 Tab, Rfl: 3  •  duloxetine (CYMBALTA) 30 MG Cap DR Particles, Take 1 Cap by mouth every day., Disp: 30 Cap, Rfl: 2  •  Calcium Carb-Cholecalciferol (CALCIUM + D3) 600-200 MG-UNIT Tab, TAKE 1 TABLET BY MOUTH TWICE DAILY. -OSTEOPOROSIS (CYCLE MED), Disp: 60 Tab, Rfl: 11  •  fluticasone (FLONASE) 50 MCG/ACT nasal spray, Spray 1 Spray in nose every day., Disp: 16 g, Rfl: 1  •  vitamin D (CHOLECALCIFEROL) 1000 UNIT Tab, Take 1 Tab by mouth every day., Disp: 30 Tab, Rfl: 11  •  Memantine HCl ER (NAMENDA XR) 28 MG CAPSULE SR 24 HR, Take 1 Cap by mouth every day., Disp: 30 Cap, Rfl: 11  •  RAZADYNE ER 24 MG ER  capsule, TAKE 1 CAPSULE BY MOUTH EVERY MORNING WITH BREAKFAST., Disp: 90 Cap, Rfl: 3  •  ibuprofen (MOTRIN) 400 MG Tab, Take 1 Tab by mouth every 6 hours as needed., Disp: 90 Tab, Rfl: 0  •  alendronate (FOSAMAX) 70 MG Tab, Take 1 Tab by mouth every 7 days., Disp: 12 Tab, Rfl: 1  •  BIOTIN MAXIMUM STRENGTH PO, Take 10,000 mcg by mouth every day., Disp: , Rfl:   •  acetaminophen (TYLENOL) 325 MG Tab, Take 650 mg by mouth every four hours as needed., Disp: , Rfl:   •  cyanocobalamin (VITAMIN B-12) 500 MCG TABS, Take 500 mcg by mouth every day., Disp: , Rfl:   •  Calcium Carbonate-Vit D-Min (CALCIUM 1200 PO), Take 1,200 mg by mouth every day., Disp: , Rfl:   •  loratadine (CLARITIN) 10 MG Tab, Take 1 Tab by mouth every day., Disp: 90 Tab, Rfl: 3  •  docusate sodium (COLACE) 100 MG Cap, Take 100 mg by mouth 2 times a day., Disp: , Rfl:   •  Hydrocortisone-Aloe Vera 1 % Cream, by Apply externally route., Disp: , Rfl:     Labs:    Physical Examination:  Vital signs: /86 mmHg  Pulse 74  Ht 1.524 m (5')  Wt 54.432 kg (120 lb)  BMI 23.44 kg/m2  SpO2 92%  General: No apparent distress, cooperative  Eyes: No scleral icterus or discharge  ENMT: Normal on external inspection of nose, lips  Neck: No abnormal masses on inspection  Resp: Normal effort, clear to auscultation bilaterally   CVS: Regular rate and rhythm, S1 S2 normal, no murmur   Extremities: No edema  Neuro: Alert and oriented, deep tendon reflexes are delayed in both upper and lower extremities.  Skin: No rash  Psych: appears depressed however has no suicidal ideation, still in overall good spirits    Assessment and Plan:    1. Hypothyroidism (acquired)  Chemical and clinically hypothyroid. We will repeat the labs again after 6 weeks. If labs still show confusing results will consider stopping her high doses of Biotene for a few weeks and then repeat the thyroid function test again to be able to make an appropriate interpretation. (High doses of Biotene  can interfere with the testing of TSH and T4)    - levothyroxine (SYNTHROID) 100 MCG Tab; Take 1 Tab by mouth Every morning on an empty stomach.  Dispense: 30 Tab; Refill: 3  - ESTRADIOL; Future  - TSH; Future  - FREE THYROXINE; Future  - TRIIDOTHYRONINE; Future    2. Pituitary adenoma (CMS-HCC)  The pituitary adenoma is only about 4 mm. No further intervention is required at this point. Will order labs to assess pituitary function.  - FSH/LH; Future  - ESTRADIOL; Future  - ACTH; Future  - CORTISOL; Future    3. Vitamin D deficiency  Low vitamin D can also contribute to fatigue and therefore with obtained labs for further clarification.  - VITAMIN D,25 HYDROXY; Future    4. Vitamin B12 deficiency  We will follow up on the vitamin B12 levels as that can also contribute to tiredness.  - VITAMIN B12; Future      Return in about 1 month (around 6/5/2017).    Thank you for allowing me to participate in the care of this patient.    Sulaiman Woo M.D.  05/05/2017    CC:   XAVIER Sr.P.R.N.    This note was created using voice recognition software (Dragon). The accuracy of the dictation is limited by the abilities of the software. I have reviewed the note prior to signing, however some errors in grammar and context are still possible. If you have any questions related to this note please do not hesitate to contact our office.

## 2017-05-05 NOTE — MR AVS SNAPSHOT
Usrzula Witt   2017 1:00 PM   Office Visit   MRN: [id number]    Department:  Endocrinology Med [facility]   Dept Phone:  [phone]    Description:  Female : 1945   Provider:  Sulaiman Woo M.D.           Allergies as of 2017     Allergen Noted Reactions    Nkda [No Known Drug Allergy] 2016         You were diagnosed with     Hypothyroidism (acquired)   [658538]       Pituitary adenoma (CMS-HCC)   [951550]       Vitamin D deficiency   [2767545]       Vitamin B12 deficiency   [134330]         Vital Signs     Blood Pressure Pulse Height Weight Body Mass Index Oxygen Saturation    132/86 mmHg 74 1.524 m (5') 54.432 kg (120 lb) 23.44 kg/m2 92%    Smoking Status                   Former Smoker           Basic Information     Date Of Birth Sex Race Ethnicity Preferred Language    1945 Female White Non- English      Your appointments     2017  1:10 PM   Established Patient with Sulaiman Woo M.D.   [facility] End[facility]in[facility]    [address], [address]  [place] [address] [phone]           You will be receiving a confirmation call a few days before your appointment from our automated call confirmation system.            Aug 25, 2017  1:20 PM   Follow Up Visit with Rory Maldonado M.D.   [facility] Neurology (--)    [address], [address]  [place] [address] [phone]           You will be receiving a confirmation call a few days before your appointment from our automated call confirmation system.              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 D44.9   8/15/2012 - Present    S/P subtotal thyroidectomy E89.0   Unknown - Present    Hypothyroidism, postsurgical E89.0   Unknown - Present    DNR (do not resuscitate) Z66   7/21/2016 - Present    Encounter for examination for admission to nursing home Z02.2   7/25/2016 - Present    Late onset Alzheimer's disease without behavioral disturbance G30.1, F02.80   2/24/2017 - Present      Health Maintenance        Date Due Completion Dates    COLON CANCER SCREENING ANNUAL FIT 1945 ---    IMM DTaP/Tdap/Td Vaccine (1 - Tdap) 3/10/1962 ---    IMM ZOSTER VACCINE 3/10/2003 ---    IMM PNEUMOCOCCAL 65+ (ADULT) LOW/MEDIUM RISK SERIES (2 of 2 - PPSV23) 6/15/2012 6/15/2011    MAMMOGRAM 2/16/2018 2/16/2017, 12/15/2015, 2/18/2014, 9/27/2012    BONE DENSITY 4/29/2021 4/29/2016, 9/27/2012            Current Immunizations     13-VALENT PCV PREVNAR 6/15/2011    Influenza TIV (IM) 10/21/2013    Influenza Vaccine Quad Inj (Preserved) 10/24/2016    Tuberculin Skin Test 7/11/2012      Below and/or attached are the medications your provider expects you to take. Review all of your home medications and newly ordered medications with your provider and/or pharmacist. Follow medication instructions as directed by your provider and/or pharmacist. Please keep your medication list with you and share with your provider. Update the information when medications are discontinued, doses are changed, or new medications (including over-the-counter products) are added; and carry medication information at all times in the event of emergency situations     Allergies:  NKDA - (reactions not documented)               Medications  Valid as of: May 05, 2017 -  2:08 PM    Generic Name Brand Name Tablet Size Instructions for use    Acetaminophen (Tab) TYLENOL 325 MG Take 650 mg by mouth every four hours as needed.        Alendronate Sodium  (Tab) FOSAMAX 70 MG Take 1 Tab by mouth every 7 days.        Biotin   Take 10,000 mcg by mouth every day.        Calcium Carb-Cholecalciferol (Tab) Calcium + D3 600-200 MG-UNIT TAKE 1 TABLET BY MOUTH TWICE DAILY. -OSTEOPOROSIS (CYCLE MED)        Calcium Carbonate-Vit D-Min   Take 1,200 mg by mouth every day.        Cholecalciferol (Tab) cholecalciferol 1000 UNIT Take 1 Tab by mouth every day.        Cyanocobalamin (Tab) VITAMIN B-12 500 MCG Take 500 mcg by mouth every day.        Docusate Sodium (Cap) COLACE 100 MG Take 100 mg by mouth 2 times a day.        DULoxetine HCl (Cap DR Particles) CYMBALTA 30 MG Take 1 Cap by mouth every day.        Fluticasone Propionate (Suspension) FLONASE 50 MCG/ACT Spray 1 Spray in nose every day.        Galantamine Hydrobromide (CAPSULE SR 24 HR) RAZADYNE ER 24 MG TAKE 1 CAPSULE BY MOUTH EVERY MORNING WITH BREAKFAST.        Hydrocortisone-Aloe Vera (Cream) Hydrocortisone-Aloe Vera 1 % by Apply externally route.        Ibuprofen (Tab) MOTRIN 400 MG Take 1 Tab by mouth every 6 hours as needed.        Levothyroxine Sodium (Tab) SYNTHROID 100 MCG Take 1 Tab by mouth Every morning on an empty stomach.        Loratadine (Tab) CLARITIN 10 MG Take 1 Tab by mouth every day.        Memantine HCl (CAPSULE SR 24 HR) NAMENDA 28 MG Take 1 Cap by mouth every day.        .                 Medicines prescribed today were sent to:     [facility] [place] [address] [place] [address] [facility] [address]    Phone: [phone] Fax: [phone]    Open 24 Hours?: No      Medication refill instructions:       If your prescription bottle indicates you have medication refills left, it is not necessary to call your provider’s office. Please contact your pharmacy and they will refill your medication.    If your prescription bottle indicates you do not have any refills left, you may request refills at any time through one of the following ways: The online [employer] system  (except Urgent Care), by calling your provider’s office, or by asking your pharmacy to contact your provider’s office with a refill request. Medication refills are processed only during regular business hours and may not be available until the next business day. Your provider may request additional information or to have a follow-up visit with you prior to refilling your medication.   *Please Note: Medication refills are assigned a new Rx number when refilled electronically. Your pharmacy may indicate that no refills were authorized even though a new prescription for the same medication is available at the pharmacy. Please request the medicine by name with the pharmacy before contacting your provider for a refill.        Your To Do List     Future Labs/Procedures Complete By Expires    ACTH  As directed 5/5/2018    CORTISOL  As directed 5/5/2018    Comments:    Between 7.0 am and 9.0 am. Fasting not required.    ESTRADIOL  As directed 5/5/2018    FREE THYROXINE  As directed 5/5/2018    FSH/LH  As directed 5/5/2018    TRIIDOTHYRONINE  As directed 5/5/2018    Comments:    Total T3    TSH  As directed 5/5/2018    VITAMIN B12  As directed 5/5/2018    VITAMIN D,25 HYDROXY  As directed 5/5/2018         [employer] Access Code: Activation code not generated  Current [employer] Status: Active

## 2017-05-10 ENCOUNTER — TELEPHONE (OUTPATIENT)
Dept: MEDICAL GROUP | Facility: MEDICAL CENTER | Age: 72
End: 2017-05-10

## 2017-05-10 DIAGNOSIS — F02.80 ALZHEIMER'S DEMENTIA WITHOUT BEHAVIORAL DISTURBANCE, UNSPECIFIED TIMING OF DEMENTIA ONSET: ICD-10-CM

## 2017-05-10 DIAGNOSIS — G30.9 ALZHEIMER'S DEMENTIA WITHOUT BEHAVIORAL DISTURBANCE, UNSPECIFIED TIMING OF DEMENTIA ONSET: ICD-10-CM

## 2017-05-10 NOTE — TELEPHONE ENCOUNTER
1. Caller Name: Margarita (Pt's Sister)                                         Call Back Number: [phone]      Patient approves a detailed voicemail message: yes    2. SPECIFIC Action To Be Taken: Calling to request a referral to Dr. Merrill in Neurolgy for 2nd Opinion.     3. Diagnosis/Clinical Reason for Request: Alzheimer's    4. Specialty & Provider Name/Lab/Imaging Location: Neurology, Dr. Merrill     5. Is appointment scheduled for requested order/referral: no    Pt would like a second opinion, is not happy with Dr. Sarabia.

## 2017-05-15 ENCOUNTER — PATIENT OUTREACH (OUTPATIENT)
Dept: HEALTH INFORMATION MANAGEMENT | Facility: OTHER | Age: 72
End: 2017-05-15

## 2017-05-15 NOTE — PROGRESS NOTES
3rd attempt to reach Urszula for medication review. Left a voice message requesting patient to call back.     Plan: Mailed a letter to patient describing our services. Will follow-up when patient makes contact.     Michelle Zazueta, MIGUEL ÁNGELD

## 2017-06-02 ENCOUNTER — TELEPHONE (OUTPATIENT)
Dept: ENDOCRINOLOGY | Facility: MEDICAL CENTER | Age: 72
End: 2017-06-02

## 2017-06-02 NOTE — TELEPHONE ENCOUNTER
Spoke to sister(Margarita) and she mentioned that she will bring her sister on Monday in [facility].    Did informed Pt sister to call our office on Monday as soon as they had the blood work done.    Thank you  Marisela

## 2017-06-05 ENCOUNTER — HOSPITAL ENCOUNTER (OUTPATIENT)
Dept: LAB | Facility: MEDICAL CENTER | Age: 72
End: 2017-06-05
Attending: INTERNAL MEDICINE
Payer: MEDICARE

## 2017-06-05 DIAGNOSIS — E55.9 VITAMIN D DEFICIENCY: ICD-10-CM

## 2017-06-05 DIAGNOSIS — E53.8 VITAMIN B12 DEFICIENCY: ICD-10-CM

## 2017-06-05 DIAGNOSIS — E03.9 HYPOTHYROIDISM (ACQUIRED): ICD-10-CM

## 2017-06-05 DIAGNOSIS — D35.2 PITUITARY ADENOMA (HCC): ICD-10-CM

## 2017-06-05 LAB
25(OH)D3 SERPL-MCNC: 30 NG/ML (ref 30–100)
ESTRADIOL SERPL-MCNC: <20 PG/ML
FSH SERPL-ACNC: 98.7 MIU/ML
LH SERPL-ACNC: 33 IU/L
T3 SERPL-MCNC: 244.7 NG/DL (ref 60–181)
T4 FREE SERPL-MCNC: 2.83 NG/DL (ref 0.53–1.43)
TSH SERPL DL<=0.005 MIU/L-ACNC: 0.64 UIU/ML (ref 0.3–3.7)
VIT B12 SERPL-MCNC: >1500 PG/ML (ref 211–911)

## 2017-06-05 PROCEDURE — 82306 VITAMIN D 25 HYDROXY: CPT

## 2017-06-05 PROCEDURE — 82024 ASSAY OF ACTH: CPT

## 2017-06-05 PROCEDURE — 84443 ASSAY THYROID STIM HORMONE: CPT

## 2017-06-05 PROCEDURE — 82607 VITAMIN B-12: CPT

## 2017-06-05 PROCEDURE — 84439 ASSAY OF FREE THYROXINE: CPT

## 2017-06-05 PROCEDURE — 36415 COLL VENOUS BLD VENIPUNCTURE: CPT

## 2017-06-05 PROCEDURE — 83001 ASSAY OF GONADOTROPIN (FSH): CPT

## 2017-06-05 PROCEDURE — 82670 ASSAY OF TOTAL ESTRADIOL: CPT

## 2017-06-05 PROCEDURE — 84480 ASSAY TRIIODOTHYRONINE (T3): CPT

## 2017-06-05 PROCEDURE — 83002 ASSAY OF GONADOTROPIN (LH): CPT

## 2017-06-06 ENCOUNTER — TELEPHONE (OUTPATIENT)
Dept: NEUROLOGY | Facility: MEDICAL CENTER | Age: 72
End: 2017-06-06

## 2017-06-06 NOTE — TELEPHONE ENCOUNTER
Pharmacy faxed asking for a prescription of Ondansetron 4mg ODT. I find no RX in our system of this medication. Please advise. Thanks!  - CR

## 2017-06-07 ENCOUNTER — TELEPHONE (OUTPATIENT)
Dept: ENDOCRINOLOGY | Facility: MEDICAL CENTER | Age: 72
End: 2017-06-07

## 2017-06-07 LAB — ACTH PLAS-MCNC: 18 PG/ML (ref 6–58)

## 2017-06-09 ENCOUNTER — HOSPITAL ENCOUNTER (OUTPATIENT)
Dept: LAB | Facility: MEDICAL CENTER | Age: 72
End: 2017-06-09
Attending: INTERNAL MEDICINE
Payer: MEDICARE

## 2017-06-09 DIAGNOSIS — D35.2 PITUITARY ADENOMA (HCC): ICD-10-CM

## 2017-06-09 LAB — CORTIS SERPL-MCNC: 9.7 UG/DL (ref 0–23)

## 2017-06-09 PROCEDURE — 36415 COLL VENOUS BLD VENIPUNCTURE: CPT

## 2017-06-09 PROCEDURE — 82533 TOTAL CORTISOL: CPT

## 2017-06-12 ENCOUNTER — OFFICE VISIT (OUTPATIENT)
Dept: ENDOCRINOLOGY | Facility: MEDICAL CENTER | Age: 72
End: 2017-06-12
Payer: MEDICARE

## 2017-06-12 VITALS
OXYGEN SATURATION: 88 % | WEIGHT: 120 LBS | BODY MASS INDEX: 21.26 KG/M2 | SYSTOLIC BLOOD PRESSURE: 126 MMHG | HEIGHT: 63 IN | DIASTOLIC BLOOD PRESSURE: 82 MMHG | HEART RATE: 80 BPM

## 2017-06-12 DIAGNOSIS — E03.9 ACQUIRED HYPOTHYROIDISM: ICD-10-CM

## 2017-06-12 DIAGNOSIS — R53.83 FATIGUE, UNSPECIFIED TYPE: ICD-10-CM

## 2017-06-12 DIAGNOSIS — E55.9 VITAMIN D DEFICIENCY: ICD-10-CM

## 2017-06-12 PROCEDURE — 99214 OFFICE O/P EST MOD 30 MIN: CPT | Performed by: INTERNAL MEDICINE

## 2017-06-12 RX ORDER — ERGOCALCIFEROL 1.25 MG/1
50000 CAPSULE ORAL
Qty: 14 CAP | Refills: 0 | Status: SHIPPED | OUTPATIENT
Start: 2017-06-12 | End: 2017-07-24

## 2017-06-12 NOTE — PROGRESS NOTES
Endocrinology Clinic Progress Note  PCP: BEA Sr    CC: Follow-up for hypothyroidism    HPI:  Urszula Witt is a 72 y.o. old patient who comes in today for routine follow up. For her hypothyroidism she is on 100 µg of levothyroxine daily (in the previous visit I had increased her levothyroxine from 50 to 100 µg daily). Both the patient and her sister states that she feels much better compared to her previous visit. He is not complaining of dizziness anymore. Sister also states that her energy levels have improved and overall she just looks in better health compared to the previous visit.  Patient agrees to all of this and states that she clearly feels much better than before however there is still this element of tiredness which is persistent.    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   • Anxiety and depression    • Vitamin d deficiency 03/29/2012   • Allergic rhinitis 03/29/2012   • HEMORRHOIDS 03/29/2012   • Varicose veins 03/29/2012   • Preventative health care 03/29/2012   • Osteoporosis    • Restless legs syndrome (RLS)        Medications:    Current outpatient prescriptions:   •  vitamin D, Ergocalciferol, (DRISDOL) 15409 UNITS Cap capsule, Take 1 Cap by mouth every 3 days for 42 days., Disp: 14 Cap, Rfl: 0  •  levothyroxine (SYNTHROID) 100 MCG Tab, Take 1 Tab by mouth Every morning on an empty stomach., Disp: 30 Tab, Rfl: 3  •  duloxetine (CYMBALTA) 30 MG Cap DR Particles, Take 1 Cap by mouth every day., Disp: 30 Cap, Rfl:  "2  •  Calcium Carb-Cholecalciferol (CALCIUM + D3) 600-200 MG-UNIT Tab, TAKE 1 TABLET BY MOUTH TWICE DAILY. -OSTEOPOROSIS (CYCLE MED), Disp: 60 Tab, Rfl: 11  •  fluticasone (FLONASE) 50 MCG/ACT nasal spray, Spray 1 Spray in nose every day., Disp: 16 g, Rfl: 1  •  vitamin D (CHOLECALCIFEROL) 1000 UNIT Tab, Take 1 Tab by mouth every day., Disp: 30 Tab, Rfl: 11  •  loratadine (CLARITIN) 10 MG Tab, Take 1 Tab by mouth every day., Disp: 90 Tab, Rfl: 3  •  Memantine HCl ER (NAMENDA XR) 28 MG CAPSULE SR 24 HR, Take 1 Cap by mouth every day., Disp: 30 Cap, Rfl: 11  •  RAZADYNE ER 24 MG ER capsule, TAKE 1 CAPSULE BY MOUTH EVERY MORNING WITH BREAKFAST., Disp: 90 Cap, Rfl: 3  •  ibuprofen (MOTRIN) 400 MG Tab, Take 1 Tab by mouth every 6 hours as needed., Disp: 90 Tab, Rfl: 0  •  alendronate (FOSAMAX) 70 MG Tab, Take 1 Tab by mouth every 7 days., Disp: 12 Tab, Rfl: 1  •  BIOTIN MAXIMUM STRENGTH PO, Take 10,000 mcg by mouth every day., Disp: , Rfl:   •  acetaminophen (TYLENOL) 325 MG Tab, Take 650 mg by mouth every four hours as needed., Disp: , Rfl:   •  docusate sodium (COLACE) 100 MG Cap, Take 100 mg by mouth 2 times a day., Disp: , Rfl:   •  Hydrocortisone-Aloe Vera 1 % Cream, by Apply externally route., Disp: , Rfl:   •  cyanocobalamin (VITAMIN B-12) 500 MCG TABS, Take 500 mcg by mouth every day., Disp: , Rfl:   •  Calcium Carbonate-Vit D-Min (CALCIUM 1200 PO), Take 1,200 mg by mouth every day., Disp: , Rfl:     Labs:     Physical Examination:  Vital signs: /82 mmHg  Pulse 80  Ht 1.6 m (5' 3\")  Wt 54.432 kg (120 lb)  BMI 21.26 kg/m2  SpO2 88%  General: No apparent distress, cooperative  Eyes: No scleral icterus, no discharge, normal eyelids  Neck: No abnormal masses on inspection   Resp: Normal effort, clear to auscultation bilaterally  CVS: Regular rate and rhythm, S1 S2 normal, no murmur  Extremities: No lower extremity edema  Musculoskeletal: Normal digits and nails  Skin: No rash on visible skin  Psych: Alert " and oriented, normal mood and affect    Assessment and Plan:    1. Vitamin D deficiency  Her vitamin D levels are low and might be still contributing to her fatigue. Loading dose with 50,000 units as prescribed will benefit her tremendously.    2. Acquired hypothyroidism  Continue the current dose of levothyroxine. Repeat T4 and TSH in about 4-6 weeks.    3. Fatigue, unspecified type  Vitamin D supplementation will help.      Return in about 11 weeks (around 8/28/2017).    Thank you for allowing me to participate in the care of this patient.    Sulaiman Woo M.D.    CC:   NGOZI Sr.    This note was created using voice recognition software (Dragon). The accuracy of the dictation is limited by the abilities of the software. I have reviewed the note prior to signing, however some errors in grammar and context are still possible. If you have any questions related to this note please do not hesitate to contact our office.

## 2017-06-23 DIAGNOSIS — F02.80 LATE ONSET ALZHEIMER'S DISEASE WITHOUT BEHAVIORAL DISTURBANCE (HCC): ICD-10-CM

## 2017-06-23 DIAGNOSIS — G30.1 LATE ONSET ALZHEIMER'S DISEASE WITHOUT BEHAVIORAL DISTURBANCE (HCC): ICD-10-CM

## 2017-06-23 RX ORDER — DULOXETIN HYDROCHLORIDE 30 MG/1
30 CAPSULE, DELAYED RELEASE ORAL DAILY
Qty: 30 CAP | Refills: 2 | Status: SHIPPED | OUTPATIENT
Start: 2017-06-23

## 2017-06-23 NOTE — TELEPHONE ENCOUNTER
Pt requesting a refill on Duloxetine, pt was seen on 2/24/17. Rx was written on 4/13/17 with 2 additional refills. Pt has a f/v on 8/25/17.

## 2017-06-27 DIAGNOSIS — R52 MILD PAIN: ICD-10-CM

## 2017-06-28 DIAGNOSIS — R52 MILD PAIN: ICD-10-CM

## 2017-06-28 RX ORDER — IBUPROFEN 400 MG/1
400 TABLET ORAL EVERY 6 HOURS PRN
Qty: 90 TAB | Refills: 0 | Status: SHIPPED | OUTPATIENT
Start: 2017-06-28

## 2017-06-28 RX ORDER — IBUPROFEN 400 MG/1
400 TABLET ORAL EVERY 6 HOURS PRN
Qty: 90 TAB | Refills: 0 | Status: SHIPPED | OUTPATIENT
Start: 2017-06-28 | End: 2017-06-28 | Stop reason: SDUPTHER

## 2017-06-29 ENCOUNTER — TELEPHONE (OUTPATIENT)
Dept: MEDICAL GROUP | Facility: MEDICAL CENTER | Age: 72
End: 2017-06-29

## 2017-06-30 NOTE — TELEPHONE ENCOUNTER
Last saw pt 2/23/17  Needs face to face within 90 days to order home health per medicare guidelines

## 2017-06-30 NOTE — TELEPHONE ENCOUNTER
Angelia from [facility] called states daughter called her wanting pt to have occupational therapy with [facility].  They were wondering if you could do an order form for patient.     [phone]  Fax [phone]    Faxed with demo and last ov notes.

## 2017-07-03 ENCOUNTER — TELEPHONE (OUTPATIENT)
Dept: MEDICAL GROUP | Facility: MEDICAL CENTER | Age: 72
End: 2017-07-03

## 2017-07-03 RX ORDER — OXYCODONE HYDROCHLORIDE AND ACETAMINOPHEN 5; 325 MG/1; MG/1
1-2 TABLET ORAL EVERY 8 HOURS PRN
Qty: 20 TAB | Refills: 0 | Status: SHIPPED | OUTPATIENT
Start: 2017-07-03

## 2017-07-03 NOTE — TELEPHONE ENCOUNTER
Request received from [facility] for refill of oxycodone which patient is taking on very rare occasions   report reviewed, last prescription filled one year ago  Small amount of refill provided, please notify care facility that the prescription will need to be picked up from the  as this medication cannot be faxed or called in

## 2017-07-07 ENCOUNTER — OFFICE VISIT (OUTPATIENT)
Dept: MEDICAL GROUP | Facility: MEDICAL CENTER | Age: 72
End: 2017-07-07
Payer: MEDICARE

## 2017-07-07 VITALS
HEART RATE: 85 BPM | SYSTOLIC BLOOD PRESSURE: 116 MMHG | HEIGHT: 63 IN | TEMPERATURE: 98.4 F | RESPIRATION RATE: 16 BRPM | BODY MASS INDEX: 21.26 KG/M2 | OXYGEN SATURATION: 97 % | DIASTOLIC BLOOD PRESSURE: 62 MMHG | WEIGHT: 120 LBS

## 2017-07-07 DIAGNOSIS — F32.A ANXIETY AND DEPRESSION: ICD-10-CM

## 2017-07-07 DIAGNOSIS — F02.80 LATE ONSET ALZHEIMER'S DISEASE WITHOUT BEHAVIORAL DISTURBANCE (HCC): ICD-10-CM

## 2017-07-07 DIAGNOSIS — F41.9 ANXIETY AND DEPRESSION: ICD-10-CM

## 2017-07-07 DIAGNOSIS — E89.0 HYPOTHYROIDISM, POSTSURGICAL: ICD-10-CM

## 2017-07-07 DIAGNOSIS — G30.1 LATE ONSET ALZHEIMER'S DISEASE WITHOUT BEHAVIORAL DISTURBANCE (HCC): ICD-10-CM

## 2017-07-07 PROCEDURE — 99214 OFFICE O/P EST MOD 30 MIN: CPT | Performed by: NURSE PRACTITIONER

## 2017-07-07 ASSESSMENT — PATIENT HEALTH QUESTIONNAIRE - PHQ9
SUM OF ALL RESPONSES TO PHQ QUESTIONS 1-9: 12
CLINICAL INTERPRETATION OF PHQ2 SCORE: 4
5. POOR APPETITE OR OVEREATING: 2 - MORE THAN HALF THE DAYS

## 2017-07-07 NOTE — PROGRESS NOTES
Subjective:     Chief Complaint   Patient presents with   • Follow-Up     Urszula Witt is a 72 y.o. female here today to follow up on:    Hypothyroidism, postsurgical  Now followed by endocrinology and her back on levothyroxine 100 µg daily  Some symptoms have improved including her dizziness which is significantly better  Recent labs continue to show elevated T4 and T3, endocrinology note reviewed with plan for repeat labs and no dose change at this time  Denies palpitation, diarrhea, hair loss  She does continue to have fatigue, sleeps more than she feels is normal    Anxiety and depression  She continues to be depressed, unhappy with her current living situation and loss of independence  Weaned off of Paxil and started on Cymbalta by her neurologist, family has not noted any significant change  She continues to be tearful  She has follow-up scheduled next month    Late onset Alzheimer's disease without behavioral disturbance  She continues with Razadyne and Namenda  Tolerating medication without difficulty  She continues to be independent in self-care  She has assistance with managing her medications  We did recently receive a request for home health from the daughter although it's unclear exactly what services she would like. There is medication management mentioned but the patient has passed through her facility. Sister is unsure what the doctors intent was       Current medicines (including changes today)  Current Outpatient Prescriptions   Medication Sig Dispense Refill   • oxycodone-acetaminophen (PERCOCET) 5-325 MG Tab Take 1-2 Tabs by mouth every 8 hours as needed. 20 Tab 0   • ibuprofen (MOTRIN) 400 MG Tab Take 1 Tab by mouth every 6 hours as needed. 90 Tab 0   • duloxetine (CYMBALTA) 30 MG Cap DR Particles Take 1 Cap by mouth every day. 30 Cap 2   • vitamin D, Ergocalciferol, (DRISDOL) 44827 UNITS Cap capsule Take 1 Cap by mouth every 3 days for 42 days. 14 Cap 0   • levothyroxine (SYNTHROID) 100  "MCG Tab Take 1 Tab by mouth Every morning on an empty stomach. 30 Tab 3   • Calcium Carb-Cholecalciferol (CALCIUM + D3) 600-200 MG-UNIT Tab TAKE 1 TABLET BY MOUTH TWICE DAILY. -OSTEOPOROSIS (CYCLE MED) 60 Tab 11   • fluticasone (FLONASE) 50 MCG/ACT nasal spray Spray 1 Spray in nose every day. 16 g 1   • vitamin D (CHOLECALCIFEROL) 1000 UNIT Tab Take 1 Tab by mouth every day. 30 Tab 11   • loratadine (CLARITIN) 10 MG Tab Take 1 Tab by mouth every day. 90 Tab 3   • Memantine HCl ER (NAMENDA XR) 28 MG CAPSULE SR 24 HR Take 1 Cap by mouth every day. 30 Cap 11   • RAZADYNE ER 24 MG ER capsule TAKE 1 CAPSULE BY MOUTH EVERY MORNING WITH BREAKFAST. 90 Cap 3   • alendronate (FOSAMAX) 70 MG Tab Take 1 Tab by mouth every 7 days. 12 Tab 1   • BIOTIN MAXIMUM STRENGTH PO Take 10,000 mcg by mouth every day.     • acetaminophen (TYLENOL) 325 MG Tab Take 650 mg by mouth every four hours as needed.     • docusate sodium (COLACE) 100 MG Cap Take 100 mg by mouth 2 times a day.     • Hydrocortisone-Aloe Vera 1 % Cream by Apply externally route.     • cyanocobalamin (VITAMIN B-12) 500 MCG TABS Take 500 mcg by mouth every day.     • Calcium Carbonate-Vit D-Min (CALCIUM 1200 PO) Take 1,200 mg by mouth every day.       No current facility-administered medications for this visit.     She  has a past medical history of Vitamin D deficiency; Varicose veins; Allergic rhinitis; Hemorrhoids; Anxiety; Osteoporosis; Restless legs syndrome (RLS); Arthritis; Thyroid nodule (2012); CATARACT; Depression; S/P subtotal thyroidectomy (2012); Hypothyroidism, postsurgical (2012); and Dementia.    ROS included above     Objective:     Blood pressure 116/62, pulse 85, temperature 36.9 °C (98.4 °F), resp. rate 16, height 1.6 m (5' 2.99\"), weight 54.432 kg (120 lb), SpO2 97 %. Body mass index is 21.26 kg/(m^2).     Physical Exam:  General: Alert, oriented in no acute distress.  Eye contact is good, speech is normal, affect calm  Lungs: clear to auscultation " bilaterally, normal effort, no wheeze/ rhonchi/ rales.  CV: regular rate and rhythm, S1, S2, no murmur  Abdomen: soft, nontender  Ext: no edema, color normal, vascularity normal, temperature normal    Assessment and Plan:   The following treatment plan was discussed   1. Hypothyroidism, postsurgical   followed by endocrinology, now on levothyroxine 100 µg daily. Recent labs with elevated T4 and T3, she will be rechecking in the near future. Denies any symptoms of over treatment    2. Anxiety and depression   continues with Cymbalta, continues to struggle with depression. She does have follow-up planned with neurology. She was referred for counseling in the past, phone number provided to sister who will schedule appointment    3. Late onset Alzheimer's disease without behavioral disturbance   generally stable with NamendAlcides soliman   Continues to be unhappy with her current living situation. Followed by neurology   I had received a request from the daughter for home health services, unclear exactly what they are needing and the sister is unaware. They may contact me if able to obtain further detail        Followup: 6 months, sooner as needed         Please note that this dictation was created using voice recognition software. I have worked with consultants from the vendor as well as technical experts from [employer] to optimize the interface. I have made every reasonable attempt to correct obvious errors, but I expect that there are errors of grammar and possibly content that I did not discover before finalizing the note.

## 2017-07-07 NOTE — ASSESSMENT & PLAN NOTE
She continues with Razadyne and Namenda  Tolerating medication without difficulty  She continues to be independent in self-care  She has assistance with managing her medications  We did recently receive a request for home health from the daughter although it's unclear exactly what services she would like. There is medication management mentioned but the patient has passed through her facility. Sister is unsure what the doctors intent was

## 2017-07-07 NOTE — MR AVS SNAPSHOT
"        Urszulajosé Witt   2017 11:20 AM   Office Visit   MRN: [id number]    Department:  [facility]   Dept Phone:  [phone]    Description:  Female : 1945   Provider:  BEA Sr           Reason for Visit     Follow-Up           Allergies as of 2017     Allergen Noted Reactions    Nkda [No Known Drug Allergy] 2016         You were diagnosed with     Hypothyroidism, postsurgical   [[id number]]         Vital Signs     Blood Pressure Pulse Temperature Respirations Height Weight    116/62 mmHg 85 36.9 °C (98.4 °F) 16 1.6 m (5' 2.99\") 54.432 kg (120 lb)    Body Mass Index Oxygen Saturation Smoking Status             21.26 kg/m2 97% Former Smoker         Basic Information     Date Of Birth Sex Race Ethnicity Preferred Language    1945 Female White Non- English      Your appointments     Aug 25, 2017  1:20 PM   Follow Up Visit with Rory Maldonado M.D.   [facility] (--)    [address], [address]  [facility] [address] [phone]           You will be receiving a confirmation call a few days before your appointment from our automated call confirmation system.            Aug 28, 2017  1:10 PM   Established Patient with Sulaiman Woo M.D.   [facility] ([facility]    [address], [address]  [facility] [address] [phone]           You will be receiving a confirmation call a few days before your appointment from our automated call confirmation system.              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 D44.9   8/15/2012 - Present    S/P subtotal thyroidectomy E89.0   Unknown - Present    Hypothyroidism, postsurgical E89.0   Unknown - Present    DNR (do not resuscitate) Z66   7/21/2016 - Present    Encounter for examination for admission to nursing home Z02.2   7/25/2016 - Present    Late onset Alzheimer's disease without behavioral disturbance G30.1, F02.80   2/24/2017 - Present      Health Maintenance        Date Due Completion Dates    COLON CANCER SCREENING ANNUAL FIT 1945 ---    IMM DTaP/Tdap/Td Vaccine (1 - Tdap) 3/10/1962 ---    IMM ZOSTER VACCINE 3/10/2003 ---    IMM PNEUMOCOCCAL 65+ (ADULT) LOW/MEDIUM RISK SERIES (2 of 2 - PPSV23) 6/15/2012 6/15/2011    IMM INFLUENZA (1) 9/1/2017 10/24/2016, 9/15/2015 (Done), 10/21/2013    Override on 9/15/2015: Done    MAMMOGRAM 2/16/2018 2/16/2017, 12/15/2015, 2/18/2014, 9/27/2012    BONE DENSITY 4/29/2021 4/29/2016, 9/27/2012            Current Immunizations     13-VALENT PCV PREVNAR 6/15/2011    Influenza TIV (IM) 10/21/2013    Influenza Vaccine Quad Inj (Preserved) 10/24/2016    Tuberculin Skin Test 7/11/2012      Below and/or attached are the medications your provider expects you to take. Review all of your home medications and newly ordered medications with your provider and/or pharmacist. Follow medication instructions as directed by your provider and/or pharmacist. Please keep your medication list with you and share with your provider. Update the information when medications are discontinued, doses are changed, or new medications (including over-the-counter products) are added; and carry medication information at all times in the event of emergency situations     Allergies:  NKDA - (reactions not documented)               Medications  Valid as of: July 07, 2017 - 11:49 AM    Generic Name Brand Name Tablet Size Instructions for use    Acetaminophen (Tab) TYLENOL 325 MG Take 650 mg  by mouth every four hours as needed.        Alendronate Sodium (Tab) FOSAMAX 70 MG Take 1 Tab by mouth every 7 days.        Biotin   Take 10,000 mcg by mouth every day.        Calcium Carb-Cholecalciferol (Tab) Calcium + D3 600-200 MG-UNIT TAKE 1 TABLET BY MOUTH TWICE DAILY. -OSTEOPOROSIS (CYCLE MED)        Calcium Carbonate-Vit D-Min   Take 1,200 mg by mouth every day.        Cholecalciferol (Tab) cholecalciferol 1000 UNIT Take 1 Tab by mouth every day.        Cyanocobalamin (Tab) VITAMIN B-12 500 MCG Take 500 mcg by mouth every day.        Docusate Sodium (Cap) COLACE 100 MG Take 100 mg by mouth 2 times a day.        DULoxetine HCl (Cap DR Particles) CYMBALTA 30 MG Take 1 Cap by mouth every day.        Ergocalciferol (Cap) DRISDOL 12124 UNITS Take 1 Cap by mouth every 3 days for 42 days.        Fluticasone Propionate (Suspension) FLONASE 50 MCG/ACT Spray 1 Spray in nose every day.        Galantamine Hydrobromide (CAPSULE SR 24 HR) RAZADYNE ER 24 MG TAKE 1 CAPSULE BY MOUTH EVERY MORNING WITH BREAKFAST.        Hydrocortisone-Aloe Vera (Cream) Hydrocortisone-Aloe Vera 1 % by Apply externally route.        Ibuprofen (Tab) MOTRIN 400 MG Take 1 Tab by mouth every 6 hours as needed.        Levothyroxine Sodium (Tab) SYNTHROID 100 MCG Take 1 Tab by mouth Every morning on an empty stomach.        Loratadine (Tab) CLARITIN 10 MG Take 1 Tab by mouth every day.        Memantine HCl (CAPSULE SR 24 HR) NAMENDA 28 MG Take 1 Cap by mouth every day.        Oxycodone-Acetaminophen (Tab) PERCOCET 5-325 MG Take 1-2 Tabs by mouth every 8 hours as needed.        .                 Medicines prescribed today were sent to:     [facility] [place] - [address]    [address] [facility] [address]    Phone: [phone] Fax: [phone]    Open 24 Hours?: No      Medication refill instructions:       If your prescription bottle indicates you have medication refills left, it is not necessary to call your  provider’s office. Please contact your pharmacy and they will refill your medication.    If your prescription bottle indicates you do not have any refills left, you may request refills at any time through one of the following ways: The online Purple system (except Urgent Care), by calling your provider’s office, or by asking your pharmacy to contact your provider’s office with a refill request. Medication refills are processed only during regular business hours and may not be available until the next business day. Your provider may request additional information or to have a follow-up visit with you prior to refilling your medication.   *Please Note: Medication refills are assigned a new Rx number when refilled electronically. Your pharmacy may indicate that no refills were authorized even though a new prescription for the same medication is available at the pharmacy. Please request the medicine by name with the pharmacy before contacting your provider for a refill.           Purple Access Code: Activation code not generated  Current Purple Status: Active

## 2017-07-07 NOTE — ASSESSMENT & PLAN NOTE
Now followed by endocrinology and her back on levothyroxine 100 µg daily  Some symptoms have improved including her dizziness which is significantly better  Recent labs continue to show elevated T4 and T3, endocrinology note reviewed with plan for repeat labs and no dose change at this time  Denies palpitation, diarrhea, hair loss  She does continue to have fatigue, sleeps more than she feels is normal

## 2017-07-07 NOTE — ASSESSMENT & PLAN NOTE
She continues to be depressed, unhappy with her current living situation and loss of independence  Weaned off of Paxil and started on Cymbalta by her neurologist, family has not noted any significant change  She continues to be tearful  She has follow-up scheduled next month

## 2017-07-19 RX ORDER — LORATADINE 10 MG/1
TABLET ORAL
Qty: 90 TAB | Refills: 3 | Status: SHIPPED | OUTPATIENT
Start: 2017-07-19

## 2017-07-19 NOTE — TELEPHONE ENCOUNTER
Was the patient seen in the last year in this department? Yes , 07/07/2017    Does patient have an active prescription for medications requested? No     Received Request Via: Pharmacy

## 2017-07-20 RX ORDER — FLUTICASONE PROPIONATE 50 MCG
SPRAY, SUSPENSION (ML) NASAL
Qty: 16 G | Refills: 1 | Status: SHIPPED | OUTPATIENT
Start: 2017-07-20

## 2017-07-22 ENCOUNTER — HOSPITAL ENCOUNTER (OUTPATIENT)
Dept: LAB | Facility: MEDICAL CENTER | Age: 72
End: 2017-07-22
Attending: INTERNAL MEDICINE
Payer: MEDICARE

## 2017-07-22 DIAGNOSIS — E55.9 VITAMIN D DEFICIENCY: ICD-10-CM

## 2017-07-22 DIAGNOSIS — E03.9 ACQUIRED HYPOTHYROIDISM: ICD-10-CM

## 2017-07-22 LAB
25(OH)D3 SERPL-MCNC: 93 NG/ML (ref 30–100)
T3 SERPL-MCNC: 130.9 NG/DL (ref 60–181)
T3FREE SERPL-MCNC: 2.96 PG/ML (ref 2.4–4.2)
T4 FREE SERPL-MCNC: 2.07 NG/DL (ref 0.53–1.43)
TSH SERPL DL<=0.005 MIU/L-ACNC: 0.11 UIU/ML (ref 0.3–3.7)

## 2017-07-22 PROCEDURE — 82306 VITAMIN D 25 HYDROXY: CPT

## 2017-07-22 PROCEDURE — 84480 ASSAY TRIIODOTHYRONINE (T3): CPT

## 2017-07-22 PROCEDURE — 84439 ASSAY OF FREE THYROXINE: CPT

## 2017-07-22 PROCEDURE — 36415 COLL VENOUS BLD VENIPUNCTURE: CPT

## 2017-07-22 PROCEDURE — 84443 ASSAY THYROID STIM HORMONE: CPT

## 2017-07-22 PROCEDURE — 84481 FREE ASSAY (FT-3): CPT

## 2017-07-24 DIAGNOSIS — E03.9 HYPOTHYROIDISM (ACQUIRED): ICD-10-CM

## 2017-07-24 DIAGNOSIS — E03.9 HYPOTHYROIDISM, UNSPECIFIED TYPE: ICD-10-CM

## 2017-07-24 RX ORDER — LEVOTHYROXINE SODIUM 0.1 MG/1
TABLET ORAL
Qty: 30 TAB | Refills: 3 | Status: SHIPPED | OUTPATIENT
Start: 2017-07-24

## 2017-07-24 RX ORDER — LEVOTHYROXINE SODIUM 0.1 MG/1
100 TABLET ORAL
Qty: 30 TAB | Refills: 3 | Status: SHIPPED | OUTPATIENT
Start: 2017-07-24 | End: 2017-07-24 | Stop reason: SDUPTHER

## 2017-07-24 NOTE — PROGRESS NOTES
Called sister who is caregiver for the patient. Reached ; Left message to call us back. If she calls back just let her know that based on labs I wanted Urszula to take 100 mcg of levothyroxine daily except on Sunday when I want her to take half tablet only.

## 2017-08-03 RX ORDER — LORAZEPAM 0.5 MG/1
0.5 TABLET ORAL EVERY 8 HOURS PRN
Qty: 90 TAB | Refills: 2 | Status: SHIPPED | OUTPATIENT
Start: 2017-08-03

## 2017-08-22 ENCOUNTER — TELEPHONE (OUTPATIENT)
Dept: MEDICAL GROUP | Facility: MEDICAL CENTER | Age: 72
End: 2017-08-22

## 2017-08-22 ENCOUNTER — TELEPHONE (OUTPATIENT)
Dept: NEUROLOGY | Facility: MEDICAL CENTER | Age: 72
End: 2017-08-22

## 2017-08-22 DIAGNOSIS — F02.80 LATE ONSET ALZHEIMER'S DISEASE WITHOUT BEHAVIORAL DISTURBANCE (HCC): ICD-10-CM

## 2017-08-22 DIAGNOSIS — G30.1 LATE ONSET ALZHEIMER'S DISEASE WITHOUT BEHAVIORAL DISTURBANCE (HCC): ICD-10-CM

## 2017-08-22 NOTE — TELEPHONE ENCOUNTER
1. Caller Name: Mayuri at [facility]                      Call Back Number: [phone] ask for Mayuri to be paged    2. [facility] is having [facility] thoughts and making comments of killing herself. She is crying hysterically and very depressed. They would like to take her to ER to have a psych eval done to get her admitted to [facility]. They wanted to know if there was a faster way to have her admitted than going to ER. Please advise.     They asked to have message forwarded to provider working today.     3. Patient approves office to leave a detailed voicemail/MyChart message: yes

## 2017-08-22 NOTE — TELEPHONE ENCOUNTER
Patient will have to go to the emergency department to make sure that she is medically stable for [facility].  Rizwan Robertson M.D.

## 2017-08-22 NOTE — TELEPHONE ENCOUNTER
Received a refill request from the patient's pharmacy for:     Galantamine ER 24mg cap  Qty: 90  Refills: 3  Sig: Take 1 capsule by mouth every morning with breakfast.    If approved, please send to [facility] in [place] in patient's chart. It was not an option for me to choose to refill.

## 2017-08-23 RX ORDER — GALANTAMINE HYDROBROMIDE 24 MG/1
CAPSULE, EXTENDED RELEASE ORAL
Qty: 90 CAP | Refills: 3 | Status: SHIPPED | OUTPATIENT
Start: 2017-08-23

## 2017-08-25 ENCOUNTER — TELEPHONE (OUTPATIENT)
Dept: ENDOCRINOLOGY | Facility: MEDICAL CENTER | Age: 72
End: 2017-08-25

## 2017-08-25 ENCOUNTER — APPOINTMENT (OUTPATIENT)
Dept: NEUROLOGY | Facility: MEDICAL CENTER | Age: 72
End: 2017-08-25
Payer: MEDICARE

## 2017-08-25 NOTE — TELEPHONE ENCOUNTER
PT's Daughter called stating she was seen at [facility] and was told her labs show she has sever Hyperthyroidism. Pt and Daughter were under the impression we were lowing that. Can you call Pt back, they are concerned.

## 2017-08-28 ENCOUNTER — APPOINTMENT (OUTPATIENT)
Dept: ENDOCRINOLOGY | Facility: MEDICAL CENTER | Age: 72
End: 2017-08-28
Payer: MEDICARE

## 2017-08-29 ENCOUNTER — TELEPHONE (OUTPATIENT)
Dept: NEUROLOGY | Facility: MEDICAL CENTER | Age: 72
End: 2017-08-29

## 2017-08-29 ENCOUNTER — TELEPHONE (OUTPATIENT)
Dept: ENDOCRINOLOGY | Facility: MEDICAL CENTER | Age: 72
End: 2017-08-29

## 2017-08-29 NOTE — TELEPHONE ENCOUNTER
"Received a call from the patient's sister, Margarita, in regards to the patient's living situation. She had been staying with [facility] but in recent weeks has begun wondering off in the middle of the night so she was then transferred to \"[facility] Care\" (also with [facility]). She had become combative however and was subsequently transferred again to \"[facility]\" with [facility] psychiatric keita.     Her sister is stating that since this move, the patient is doing much worse and is very scared and feels as though her dementia has gotten much worse, where as before it seemed more stable.     Margarita is wondering what she can do? She would like to have the pt transferred back to [facility] but [facility] will not allow this and will not give her any information.    Please advise.   "

## 2017-10-02 DIAGNOSIS — G30.0 EARLY ONSET ALZHEIMER'S DISEASE WITH BEHAVIORAL DISTURBANCE (HCC): ICD-10-CM

## 2017-10-02 DIAGNOSIS — F02.818 EARLY ONSET ALZHEIMER'S DISEASE WITH BEHAVIORAL DISTURBANCE (HCC): ICD-10-CM

## 2017-10-02 RX ORDER — LORAZEPAM 1 MG/1
1 TABLET ORAL 3 TIMES DAILY
Qty: 90 TAB | Refills: 2 | Status: SHIPPED | OUTPATIENT
Start: 2017-10-02

## 2017-10-02 RX ORDER — LORAZEPAM 1 MG/1
1 TABLET ORAL NIGHTLY PRN
Qty: 30 TAB | Refills: 2 | Status: SHIPPED | OUTPATIENT
Start: 2017-10-02

## 2017-10-02 NOTE — PROGRESS NOTES
Fax received from [facility] reporting signficant increase in patient anxiety and agitated behaviors. Will try increased on Ativan, New prescription sent. Advised to notify me if further concerns

## 2017-11-06 ENCOUNTER — HOSPITAL ENCOUNTER (EMERGENCY)
Facility: MEDICAL CENTER | Age: 72
End: 2017-11-06
Attending: EMERGENCY MEDICINE
Payer: MEDICARE

## 2017-11-06 ENCOUNTER — APPOINTMENT (OUTPATIENT)
Dept: RADIOLOGY | Facility: MEDICAL CENTER | Age: 72
End: 2017-11-06
Attending: EMERGENCY MEDICINE
Payer: MEDICARE

## 2017-11-06 VITALS
TEMPERATURE: 97.9 F | HEART RATE: 85 BPM | SYSTOLIC BLOOD PRESSURE: 123 MMHG | OXYGEN SATURATION: 100 % | DIASTOLIC BLOOD PRESSURE: 65 MMHG | RESPIRATION RATE: 14 BRPM | HEIGHT: 63 IN | WEIGHT: 120 LBS | BODY MASS INDEX: 21.26 KG/M2

## 2017-11-06 DIAGNOSIS — S00.83XA CONTUSION OF FACE, INITIAL ENCOUNTER: ICD-10-CM

## 2017-11-06 DIAGNOSIS — S01.81XA FACIAL LACERATION, INITIAL ENCOUNTER: ICD-10-CM

## 2017-11-06 DIAGNOSIS — S09.90XA CLOSED HEAD INJURY, INITIAL ENCOUNTER: ICD-10-CM
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
## 2017-11-06 PROCEDURE — 700101 HCHG RX REV CODE 250: Performed by: EMERGENCY MEDICINE

## 2017-11-06 PROCEDURE — 72125 CT NECK SPINE W/O DYE: CPT

## 2017-11-06 PROCEDURE — 303747 HCHG EXTRA SUTURE

## 2017-11-06 PROCEDURE — 99284 EMERGENCY DEPT VISIT MOD MDM: CPT

## 2017-11-06 PROCEDURE — 70450 CT HEAD/BRAIN W/O DYE: CPT

## 2017-11-06 PROCEDURE — 304999 HCHG REPAIR-SIMPLE/INTERMED LEVEL 1

## 2017-11-06 RX ORDER — LIDOCAINE HCL/EPINEPHRINE/PF 2%-1:200K
10 VIAL (ML) INJECTION ONCE
Status: COMPLETED | OUTPATIENT
Start: 2017-11-06 | End: 2017-11-06

## 2017-11-06 RX ADMIN — LIDOCAINE HYDROCHLORIDE,EPINEPHRINE BITARTRATE 10 ML: 20; .005 INJECTION, SOLUTION EPIDURAL; INFILTRATION; INTRACAUDAL; PERINEURAL at 05:15

## 2017-11-06 NOTE — ED NOTES
Pt bib ambulance from [facility] after an unwitnessed GLF. The pt has a deep laceration to the nose, large raised bruised bump on the R temple. Staff found pt lying in bed with blood on her nose and blood on the side of her night stand. The pt has severe dementia and is combative.

## 2017-11-06 NOTE — ED NOTES
C-collar removed, laceration has been cleaned and sutured.  called for transport back to care facility.

## 2017-11-06 NOTE — DISCHARGE INSTRUCTIONS
Follow-up with facility physician or primary care 1-2 days for reevaluation and wound check.  Follow-up with primary care, urgent care or emergency department in 5-7 days for suture removal.    Continue home medications as previously indicated.    Keep wound clean, dry and intact. Starting tomorrow he may cleanse gently per routine, warm water, soap, pat dry. Avoid soaking wound in water. Apply antibiotic ointment twice daily. Avoid direct sunlight to wound.    Facial swelling and bruising is normal following such injury. Apply ice, 20 minutes of every hour, as needed for any swelling or discomfort.    Return to the emergency department for altered mental status, seizure, focal weakness, vomiting, wound infection or other new concerns.    Head Injury, Adult  You have a head injury. Headaches and throwing up (vomiting) are common after a head injury. It should be easy to wake up from sleeping. Sometimes you must stay in the hospital. Most problems happen within the first 24 hours. Side effects may occur up to 7-10 days after the injury.   WHAT ARE THE TYPES OF HEAD INJURIES?  Head injuries can be as minor as a bump. Some head injuries can be more severe. More severe head injuries include:  · A jarring injury to the brain (concussion).  · A bruise of the brain (contusion). This mean there is bleeding in the brain that can cause swelling.  · A cracked skull (skull fracture).  · Bleeding in the brain that collects, clots, and forms a bump (hematoma).  WHEN SHOULD I GET HELP RIGHT AWAY?   · You are confused or sleepy.  · You cannot be woken up.  · You feel sick to your stomach (nauseous) or keep throwing up (vomiting).  · Your dizziness or unsteadiness is getting worse.  · You have very bad, lasting headaches that are not helped by medicine. Take medicines only as told by your doctor.  · You cannot use your arms or legs like normal.  · You cannot walk.  · You notice changes in the black spots in the center of the colored  part of your eye (pupil).  · You have clear or bloody fluid coming from your nose or ears.  · You have trouble seeing.  During the next 24 hours after the injury, you must stay with someone who can watch you. This person should get help right away (call 911 in the [place] if you start to shake and are not able to control it (have seizures), you pass out, or you are unable to wake up.  HOW CAN I PREVENT A HEAD INJURY IN THE FUTURE?  · Wear seat belts.  · Wear a helmet while bike riding and playing sports like football.  · Stay away from dangerous activities around the house.  WHEN CAN I RETURN TO NORMAL ACTIVITIES AND ATHLETICS?  See your doctor before doing these activities. You should not do normal activities or play contact sports until 1 week after the following symptoms have stopped:  · Headache that does not go away.  · Dizziness.  · Poor attention.  · Confusion.  · Memory problems.  · Sickness to your stomach or throwing up.  · Tiredness.  · Fussiness.  · Bothered by bright lights or loud noises.  · Anxiousness or depression.  · Restless sleep.  MAKE SURE YOU:   · Understand these instructions.  · Will watch your condition.  · Will get help right away if you are not doing well or get worse.     This information is not intended to replace advice given to you by your health care provider. Make sure you discuss any questions you have with your health care provider.     Document Released: 11/30/2009 Document Revised: 01/08/2016 Document Reviewed: 08/25/2014  [employer] Interactive Patient Education ©2016 [employer]    Facial Laceration  A facial laceration is a cut on the face. These injuries can be painful and cause bleeding. Some cuts may need to be closed with stitches (sutures), skin adhesive strips, or wound glue. Cuts usually heal quickly but can leave a scar. It can take 1-2 years for the scar to go away completely.  HOME CARE   · Only take medicines as told by your doctor.  · Follow your doctor's  instructions for wound care.  For Stitches:  · Keep the cut clean and dry.  · If you have a bandage (dressing), change it at least once a day. Change the bandage if it gets wet or dirty, or as told by your doctor.  · Wash the cut with soap and water 2 times a day. Rinse the cut with water. Pat it dry with a clean towel.  · Put a thin layer of medicated cream on the cut as told by your doctor.  · You may shower after the first 24 hours. Do not soak the cut in water until the stitches are removed.  · Have your stitches removed as told by your doctor.  · Do not wear any makeup until a few days after your stitches are removed.  For Skin Adhesive Strips:  · Keep the cut clean and dry.  · Do not get the strips wet. You may take a bath, but be careful to keep the cut dry.  · If the cut gets wet, pat it dry with a clean towel.  · The strips will fall off on their own. Do not remove the strips that are still stuck to the cut.  For Wound Glue:  · You may shower or take baths. Do not soak or scrub the cut. Do not swim. Avoid heavy sweating until the glue falls off on its own. After a shower or bath, pat the cut dry with a clean towel.  · Do not put medicine or makeup on your cut until the glue falls off.  · If you have a bandage, do not put tape over the glue.  · Avoid lots of sunlight or tanning lamps until the glue falls off.  · The glue will fall off on its own in 5-10 days. Do not pick at the glue.  After Healing:  · Put sunscreen on the cut for the first year to reduce your scar.  GET HELP IF:  · You have a fever.  GET HELP RIGHT AWAY IF:   · Your cut area gets red, painful, or puffy (swollen).  · You see a yellowish-white fluid (pus) coming from the cut.     This information is not intended to replace advice given to you by your health care provider. Make sure you discuss any questions you have with your health care provider.     Document Released: 06/05/2009 Document Revised: 01/08/2016 Document Reviewed:  07/31/2014  [employer] Interactive Patient Education ©2016 [employer]  Fall Prevention in Hospitals, Adult  As a hospital patient, your condition and the treatments you receive can increase your risk for falls. Some additional risk factors for falls in a hospital include:  · Being in an unfamiliar environment.  · Being on bed rest.  · Your surgery.  · Taking certain medicines.  · Your tubing requirements, such as intravenous (IV) therapy or catheters.  It is important that you learn how to decrease fall risks while at the hospital. Below are important tips that can help prevent falls.  SAFETY TIPS FOR PREVENTING FALLS  Talk about your risk of falling.  · Ask your health care provider why you are at risk for falling. Is it your medicine, illness, tubing placement, or something else?  · Make a plan with your health care provider to keep you safe from falls.  · Ask your health care provider or pharmacist about side effects of your medicines. Some medicines can make you dizzy or affect your coordination.  Ask for help.  · Ask for help before getting out of bed. You may need to press your call button.  · Ask for assistance in getting safely to the toilet.  · Ask for a walker or cane to be put at your bedside. Ask that most of the side rails on your bed be placed up before your health care provider leaves the room.  · Ask family or friends to sit with you.  · Ask for things that are out of your reach, such as your glasses, hearing aids, telephone, bedside table, or call button.  Follow these tips to avoid falling:  · Stay lying or seated, rather than standing, while waiting for help.  · Wear rubber-soled slippers or shoes whenever you walk in the hospital.  · Avoid quick, sudden movements.  ¨ Change positions slowly.  ¨ Sit on the side of your bed before standing.  ¨ Stand up slowly and wait before you start to walk.  · Let your health care provider know if there is a spill on the floor.  · Pay careful attention to the  medical equipment, electrical cords, and tubes around you.  · When you need help, use your call button by your bed or in the bathroom. Wait for one of your health care providers to help you.  · If you feel dizzy or unsure of your footing, return to bed and wait for assistance.  · Avoid being distracted by the TV, telephone, or another person in your room.  · Do not lean or support yourself on rolling objects, such as IV poles or bedside tables.     This information is not intended to replace advice given to you by your health care provider. Make sure you discuss any questions you have with your health care provider.     Document Released: 12/15/2001 Document Revised: 01/08/2016 Document Reviewed: 08/25/2013  [employer] Interactive Patient Education ©2016 [employer]

## 2017-11-06 NOTE — DISCHARGE PLANNING
Medical Social Work    Referral: Return to Assisted Living Facility    Intervention: MSW received a call from bedside RN who states that pt is ready to return to [facility] ([facility]; [phone]).  MSW contacted Silva with [facility] who will accept pt back.  Silva states that pt is in room 2A in the memory care unit.  Silva states that they have no transport to  pt and will call pt's sister to see if she can pick pt up.  Per RN pt's sister is at bedside and due to pt's dementia and combativeness pt's sister does not feel comfortable transporting pt.  MSW relayed information to Silva with [facility] and informed her that [facility] will be arranged for pt.  MSW faxed PCS and facesheet to [facility] and made a follow up phone call to Johnny to arrange transport for 0615.  Bedside RN aware of transport time.    Plan: Pt to D/C to [facility] via [facility] at 0615.

## 2017-11-06 NOTE — ED PROVIDER NOTES
"ED Provider Note    CHIEF COMPLAINT  Chief Complaint   Patient presents with   • T-5000 GLF   • Facial Laceration       HPI  Urszula Witt is a 72 y.o. female who presents To the emergency department by ambulance from skilled nursing facility after patient was found in her bed with apparent head injury. Patient with nasal laceration, cephalohematoma with blood also noted on the nightstand adjacent to her bed. No witnessed fall. Patient with history of severe dementia, baseline per nursing staff. Staff denies anticoagulant use.    HPI is limited due to patient dementia an unwitnessed event.    REVIEW OF SYSTEMS  HPI and review of systems Limited due to patient dementia an unwitnessed event.    PAST MEDICAL HISTORY   has a past medical history of Allergic rhinitis; Anxiety; Arthritis; CATARACT; Dementia; Depression; Hemorrhoids; Hypothyroidism, postsurgical (2012); Osteoporosis; Restless legs syndrome (RLS); S/P subtotal thyroidectomy (2012); Thyroid nodule (2012); Varicose veins; and Vitamin D deficiency.    SOCIAL HISTORY  Social History     Social History Main Topics   • Smoking status: Former Smoker     Packs/day: 2.00     Years: 25.00     Types: Cigarettes     Quit date: 8/9/1987   • Smokeless tobacco: Never Used   • Alcohol use 1.5 oz/week     3 Glasses of wine per week      Comment: 1 glass of wine a week   • Drug use: No   • Sexual activity: Not on file       SURGICAL HISTORY   has a past surgical history that includes tonsillectomy and adenoidectomy; bunionectomy; appendectomy; and thyroid lobectomy (8/15/2012).    CURRENT MEDICATIONS  Home Medications    **Home medications have not yet been reviewed for this encounter**         ALLERGIES  Allergies   Allergen Reactions   • Nkda [No Known Drug Allergy]          PHYSICAL EXAM  VITAL SIGNS: /67   Pulse 90   Temp 36.6 °C (97.9 °F)   Resp 14   Ht 1.6 m (5' 3\")   Wt 54.4 kg (120 lb)   SpO2 (!) 80%   BMI 21.26 kg/m²   Pulse ox interpretation: I " interpret this pulse ox as normal.  Constitutional: Alert in no apparent distress.  HENT: Normocephalic,Right temporal frontal hematoma without crepitus. Bilateral external ears normal. Vertical nasal bridge laceration, full-thickness, approximately 3.5 cm without obvious deformity. No active epistaxis although dried blood at nares.  No septal hematoma. No oral trauma.  No apparent malocclusion or TMJ.   Eyes: Pupils are equal and reactive, Conjunctiva normal. No subconjunctival hemorrhage, chemosis or hyphema.  Neck: Cervical collar in place. No step-offs palpable.  Cardiovascular: Regular rate and rhythm, no murmurs. Distal pulses intact.    Thorax & Lungs: Poor effort, otherwise Normal breath sounds. No wheezes, rales or rhonchi. No increased work of breathing. No chest wall trauma, crepitus.  Abdomen: Soft, non-distended, non-tender. No grimace or dry to palpation. No abdominal wall trauma, ecchymosis, hematoma or abrasion.  Skin: Warm, Dry. Superficial and subacute abrasion to bilateral knees.  Back: No midline thoracic or lumbar step-offs.    Musculoskeletal: Good range of motion in all major joints. No tenderness to palpation or major deformities noted. Pelvis stable. No lower extremity shortening or rotation.  Neurologic: Alert , No gross focal motor or sensory deficits noted. Nonverbal, intermittently cooperative but nonfocal.  Psychiatric: Nonverbal. Restless. History of severe dementia.      DIAGNOSTIC STUDIES / PROCEDURES    RADIOLOGY  CT-CSPINE WITHOUT PLUS RECONS   Final Result      No acute abnormality identified.      CT-HEAD W/O   Final Result      1. Cerebral atrophy.   2. White matter lucencies most consistent with small vessel ischemic change versus demyelination or gliosis.   3. Motion degraded study; consider repeating when the patient is better able to remain still as intracranial injury is not completely excluded   4 . Otherwise, Head CT without contrast with no acute findings. No evidence of  acute cerebral infarction, hemorrhage or mass lesion.        PROCEDURE  LACERATION REPAIR PROCEDURE NOTE  The patient's identification was confirmed and consent was obtained.  This procedure was performed by Dr. Madsen  Site: Nasal bridge    Sterile procedures observed  Anesthetic used (type and amt): 3 cc lidocaine with epinephrine  Suture type/size: 5-0 nylon  Length: 3.5 cm  # of Sutures: 6  Technique: Simple interrupted  Complexity: Simple  Antibx ointment applied  Site anesthetized, irrigated with NS, explored without evidence of foreign body, wound well approximated, site covered with dry, sterile dressing. Patient tolerated procedure well without complications. Instructions for care discussed verbally and patient provided with additional written instructions for homecare and f/u.    COURSE & MEDICAL DECISION MAKING  Nursing notes and vital signs were reviewed. (See chart for details)  The patients records were reviewed, history was obtained from EMS;     ED evaluation following fall demonstrates facial contusion, nasal bridge laceration, suspect closed head injury. CT head and neck without evidence for acute trauma. Nasal bridge laceration has been repaired as described above with good hemostasis and approximation. Physical exam is otherwise unremarkable for trauma. Patient's sister at bedside, mentation at baseline, and grossly nonfocal.    Patient is stable for discharge at this time, anticipatory guidance and wound care instructions provided, to resume home medications as previously indicated, close follow-up is encouraged, and strict ED return instructions have been detailed. Patient's sister agreeable to the disposition and plan, discharge instructions have been detailed return to living facility.    FINAL IMPRESSION  (S09.90XA) Closed head injury, initial encounter  (S00.83XA) Contusion of face, initial encounter  (S01.81XA) Facial laceration, initial encounter  (W19.XXXA) Fall, initial  encounter      Electronically signed by: Corrina Madsen, 11/6/2017 3:03 AM      This dictation was created using voice recognition software. The accuracy of the dictation is limited to the abilities of the software. I expect there may be some errors of grammar and possibly content. The nursing notes were reviewed and certain aspects of this information were incorporated into this note.

## 2018-11-29 NOTE — ASSESSMENT & PLAN NOTE
Increased symptoms recently, unclear if she's been taking her daily Claritin. She is having brief episodes of vertigo as well, usually triggered by head movement or change of position. Lasting about 30 seconds at a time and then resolving. No associated nausea, vomiting, headache, facial pain  
Last labs with TSH normal at 0.74, T4 elevated at 1.83   Dose was reduced at that time to 50 µg levothyroxine daily, she's noted more fatigue since dose change. No constipation, hair loss, weight change due to recheck labs    
No

## 2021-01-14 DIAGNOSIS — Z23 NEED FOR VACCINATION: ICD-10-CM

## 2021-11-02 NOTE — MR AVS SNAPSHOT
Urszula Witt   2017 1:20 PM   Office Visit   MRN: [id number]    Department:  [facility]   Dept Phone:  [phone]    Description:  Female : 1945   Provider:  Rory Maldonado M.D.           Reason for Visit     Follow-Up Alzheimers disease.      Allergies as of 2017     Allergen Noted Reactions    Nkda [No Known Drug Allergy] 2016         You were diagnosed with     Late onset Alzheimer's disease without behavioral disturbance   [[id number]]  -  Primary       Vital Signs     Blood Pressure Pulse Temperature Height Weight Body Mass Index    138/74 mmHg 63 36 °C (96.8 °F) 1.524 m (5') 55.248 kg (121 lb 12.8 oz) 23.79 kg/m2    Oxygen Saturation Smoking Status                95% Former Smoker          Basic Information     Date Of Birth Sex Race Ethnicity Preferred Language    1945 Female White Non- English      Your appointments     Mar 07, 2017 10:30 AM   US [id number] with S YIN US 2   [facility] [facility]    [facility]  [address]  [address]  [place] [address]   [phone]            May 01, 2017  1:00 PM   Established Patient with BEA Sr   [facility] ([facility])    [address]  [place] [id number] [phone]           You will be receiving a confirmation call a few days before your appointment from our automated call confirmation system.            Aug 25, 2017  1:20 PM   Follow Up Visit with Rory Maldonado M.D.   [facility] (--)    [address], [address]  [place] [address] [phone]           You will be receiving a confirmation call a few days before your appointment from our automated call confirmation system.              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   8/15/2012 - Present    S/P subtotal thyroidectomy E89.0   Unknown - Present    Hypothyroidism, postsurgical E89.0   Unknown - Present    DNR (do not resuscitate) Z66   7/21/2016 - Present    Encounter for examination for admission to nursing home Z02.2   7/25/2016 - Present    Late onset Alzheimer's disease without behavioral disturbance G30.1, F02.80   2/24/2017 - Present      Health Maintenance        Date Due Completion Dates    COLON CANCER SCREENING ANNUAL FIT 1945 ---    IMM DTaP/Tdap/Td Vaccine (1 - Tdap) 3/10/1962 ---    IMM ZOSTER VACCINE 3/10/2003 ---    IMM PNEUMOCOCCAL 65+ (ADULT) LOW/MEDIUM RISK SERIES (2 of 2 - PPSV23) 6/15/2012 6/15/2011    MAMMOGRAM 2/16/2018 2/16/2017, 12/15/2015, 2/18/2014, 9/27/2012    BONE DENSITY 4/29/2021 4/29/2016, 9/27/2012            Current Immunizations     13-VALENT PCV PREVNAR 6/15/2011    Influenza TIV (IM) 10/21/2013    Influenza Vaccine Quad Inj (Preserved) 10/24/2016    Tuberculin Skin Test 7/11/2012      Below and/or attached are the medications your provider expects you to take. Review all of your home medications and newly ordered medications with your provider and/or pharmacist. Follow medication instructions as directed by your provider and/or pharmacist. Please keep your medication list with you and share with your provider. Update the information when medications are discontinued, doses are changed, or new medications (including over-the-counter products) are added; and carry medication information at all times in the event of emergency situations     Allergies:  NKDA - (reactions not documented)               Medications   Valid as of: February 24, 2017 -  1:50 PM    Generic Name Brand Name Tablet Size Instructions for use    Acetaminophen (Tab) TYLENOL 325 MG Take 650 mg by mouth every four hours as needed.        Alendronate Sodium (Tab) FOSAMAX 70 MG Take 1 Tab by mouth every 7 days.        Biotin   Take 10,000 mcg by mouth every day.        Calcium Carb-Cholecalciferol (Tab) Calcium + D3 600-200 MG-UNIT TAKE 1 TABLET BY MOUTH TWICE DAILY. -OSTEOPOROSIS (CYCLE MED)        Calcium Carbonate-Vit D-Min   Take 1,200 mg by mouth every day.        Cholecalciferol (Tab) cholecalciferol 1000 UNIT Take 1 Tab by mouth every day.        Cyanocobalamin (Tab) VITAMIN B-12 500 MCG Take 500 mcg by mouth every day.        Docusate Sodium (Cap) COLACE 100 MG Take 100 mg by mouth 2 times a day.        Fluticasone Propionate (Suspension) FLONASE 50 MCG/ACT Spray 1 Spray in nose every day.        Galantamine Hydrobromide (CAPSULE SR 24 HR) RAZADYNE ER 24 MG TAKE 1 CAPSULE BY MOUTH EVERY MORNING WITH BREAKFAST.        Hydrocortisone-Aloe Vera (Cream) Hydrocortisone-Aloe Vera 1 % by Apply externally route.        Ibuprofen (Tab) MOTRIN 400 MG Take 1 Tab by mouth every 6 hours as needed.        Loratadine (Tab) CLARITIN 10 MG Take 1 Tab by mouth every day.        Memantine HCl (CAPSULE SR 24 HR) NAMENDA 28 MG Take 1 Cap by mouth every day.        PARoxetine HCl (Tab) PAXIL 20 MG Take 1 Tab by mouth every day.        .                 Medicines prescribed today were sent to:     [facility] [place] - [address]    [address] [facility] [address]    Phone: [phone] Fax: [phone]    Open 24 Hours?: No    [facility] [place] [place] - [facility] [address] [address] [facility] [place] [facility] [address]    Phone: [phone] Fax: [phone]    Open 24 Hours?: No      Medication refill instructions:       If your prescription bottle indicates you have medication refills left, it is not necessary to call  your provider’s office. Please contact your pharmacy and they will refill your medication.    If your prescription bottle indicates you do not have any refills left, you may request refills at any time through one of the following ways: The online [employer] system (except Urgent Care), by calling your provider’s office, or by asking your pharmacy to contact your provider’s office with a refill request. Medication refills are processed only during regular business hours and may not be available until the next business day. Your provider may request additional information or to have a follow-up visit with you prior to refilling your medication.   *Please Note: Medication refills are assigned a new Rx number when refilled electronically. Your pharmacy may indicate that no refills were authorized even though a new prescription for the same medication is available at the pharmacy. Please request the medicine by name with the pharmacy before contacting your provider for a refill.           [employer] Access Code: Activation code not generated  Current [employer] Status: Active